# Patient Record
Sex: MALE | Race: WHITE | URBAN - METROPOLITAN AREA
[De-identification: names, ages, dates, MRNs, and addresses within clinical notes are randomized per-mention and may not be internally consistent; named-entity substitution may affect disease eponyms.]

---

## 2018-12-06 ENCOUNTER — HOSPITAL ENCOUNTER (OUTPATIENT)
Age: 38
Setting detail: SPECIMEN
Discharge: HOME | End: 2018-12-06
Payer: COMMERCIAL

## 2018-12-06 LAB
ALBUMIN SERPL-MCNC: 4.4 GM/DL (ref 3.4–5)
ALP BLD-CCNC: 55 IU/L (ref 40–129)
ALT SERPL-CCNC: 17 U/L (ref 10–40)
ANION GAP SERPL CALCULATED.3IONS-SCNC: 13 MMOL/L (ref 4–16)
AST SERPL-CCNC: 20 IU/L (ref 15–37)
BILIRUB SERPL-MCNC: 0.4 MG/DL (ref 0–1)
BUN BLDV-MCNC: 17 MG/DL (ref 6–23)
CALCIUM SERPL-MCNC: 9.6 MG/DL (ref 8.3–10.6)
CHLORIDE BLD-SCNC: 101 MMOL/L (ref 99–110)
CO2: 27 MMOL/L (ref 21–32)
CREAT SERPL-MCNC: 1.1 MG/DL (ref 0.9–1.3)
FOLATE: 16.4 NG/ML (ref 3.1–17.5)
GFR AFRICAN AMERICAN: >60 ML/MIN/1.73M2
GFR NON-AFRICAN AMERICAN: >60 ML/MIN/1.73M2
GLUCOSE BLD-MCNC: 64 MG/DL (ref 70–99)
LACTATE DEHYDROGENASE: 162 IU/L (ref 120–246)
POTASSIUM SERPL-SCNC: 4.2 MMOL/L (ref 3.5–5.1)
SODIUM BLD-SCNC: 141 MMOL/L (ref 135–145)
TOTAL PROTEIN: 6.8 GM/DL (ref 6.4–8.2)
URIC ACID: 6 MG/DL (ref 3.5–7.2)
VITAMIN B-12: 627 PG/ML (ref 211–911)

## 2018-12-06 PROCEDURE — 82746 ASSAY OF FOLIC ACID SERUM: CPT

## 2018-12-06 PROCEDURE — 86340 INTRINSIC FACTOR ANTIBODY: CPT

## 2018-12-06 PROCEDURE — 80053 COMPREHEN METABOLIC PANEL: CPT

## 2018-12-06 PROCEDURE — 82607 VITAMIN B-12: CPT

## 2018-12-06 PROCEDURE — 83615 LACTATE (LD) (LDH) ENZYME: CPT

## 2018-12-06 PROCEDURE — 84550 ASSAY OF BLOOD/URIC ACID: CPT

## 2018-12-09 LAB — INTRINSIC FACTOR AB: NEGATIVE

## 2019-03-14 ENCOUNTER — HOSPITAL ENCOUNTER (OUTPATIENT)
Age: 39
Setting detail: SPECIMEN
Discharge: HOME | End: 2019-03-14
Payer: COMMERCIAL

## 2019-03-14 LAB
ALBUMIN SERPL-MCNC: 4.6 GM/DL (ref 3.4–5)
ALP BLD-CCNC: 53 IU/L (ref 40–129)
ALT SERPL-CCNC: 15 U/L (ref 10–40)
ANION GAP SERPL CALCULATED.3IONS-SCNC: 9 MMOL/L (ref 4–16)
AST SERPL-CCNC: 19 IU/L (ref 15–37)
BILIRUB SERPL-MCNC: 0.2 MG/DL (ref 0–1)
BUN BLDV-MCNC: 17 MG/DL (ref 6–23)
CALCIUM SERPL-MCNC: 9.1 MG/DL (ref 8.3–10.6)
CHLORIDE BLD-SCNC: 103 MMOL/L (ref 99–110)
CO2: 27 MMOL/L (ref 21–32)
CREAT SERPL-MCNC: 1 MG/DL (ref 0.9–1.3)
FOLATE: >20 NG/ML (ref 3.1–17.5)
GFR AFRICAN AMERICAN: >60 ML/MIN/1.73M2
GFR NON-AFRICAN AMERICAN: >60 ML/MIN/1.73M2
GLUCOSE BLD-MCNC: 105 MG/DL (ref 70–99)
LACTATE DEHYDROGENASE: 165 IU/L (ref 120–246)
POTASSIUM SERPL-SCNC: 4.5 MMOL/L (ref 3.5–5.1)
SODIUM BLD-SCNC: 139 MMOL/L (ref 135–145)
T4 FREE: 1.26 NG/DL (ref 0.9–1.8)
TOTAL PROTEIN: 6.7 GM/DL (ref 6.4–8.2)
TSH HIGH SENSITIVITY: 1.46 UIU/ML (ref 0.27–4.2)
URIC ACID: 5.2 MG/DL (ref 3.5–7.2)
VITAMIN B-12: 587.2 PG/ML (ref 211–911)

## 2019-03-14 PROCEDURE — 83615 LACTATE (LD) (LDH) ENZYME: CPT

## 2019-03-14 PROCEDURE — 84439 ASSAY OF FREE THYROXINE: CPT

## 2019-03-14 PROCEDURE — 80053 COMPREHEN METABOLIC PANEL: CPT

## 2019-03-14 PROCEDURE — 84550 ASSAY OF BLOOD/URIC ACID: CPT

## 2019-03-14 PROCEDURE — 82607 VITAMIN B-12: CPT

## 2019-03-14 PROCEDURE — 82746 ASSAY OF FOLIC ACID SERUM: CPT

## 2019-03-14 PROCEDURE — 84443 ASSAY THYROID STIM HORMONE: CPT

## 2019-04-05 ENCOUNTER — HOSPITAL ENCOUNTER (OUTPATIENT)
Dept: CT IMAGING | Age: 39
Discharge: HOME | End: 2019-04-05
Payer: COMMERCIAL

## 2019-04-05 DIAGNOSIS — D63.0 ANEMIA ASSOC WITH CHRONIC LYMPHOCYTIC LEUKEMIA TXD WITH ERYTHROPOIETIN (HCC): ICD-10-CM

## 2019-04-05 DIAGNOSIS — C91.10 ANEMIA ASSOC WITH CHRONIC LYMPHOCYTIC LEUKEMIA TXD WITH ERYTHROPOIETIN (HCC): ICD-10-CM

## 2019-04-05 PROCEDURE — 70491 CT SOFT TISSUE NECK W/DYE: CPT

## 2019-04-05 PROCEDURE — 6360000004 HC RX CONTRAST MEDICATION: Performed by: INTERNAL MEDICINE

## 2019-04-05 RX ADMIN — IOPAMIDOL 75 ML: 755 INJECTION, SOLUTION INTRAVENOUS at 17:21

## 2019-06-27 ENCOUNTER — HOSPITAL ENCOUNTER (OUTPATIENT)
Age: 39
Setting detail: SPECIMEN
Discharge: HOME | End: 2019-06-27
Payer: COMMERCIAL

## 2019-06-27 LAB
ALBUMIN SERPL-MCNC: 4.5 GM/DL (ref 3.4–5)
ALP BLD-CCNC: 47 IU/L (ref 40–128)
ALT SERPL-CCNC: 13 U/L (ref 10–40)
ANION GAP SERPL CALCULATED.3IONS-SCNC: 10 MMOL/L (ref 4–16)
AST SERPL-CCNC: 16 IU/L (ref 15–37)
BILIRUB SERPL-MCNC: 0.2 MG/DL (ref 0–1)
BUN BLDV-MCNC: 23 MG/DL (ref 6–23)
CALCIUM SERPL-MCNC: 9.5 MG/DL (ref 8.3–10.6)
CHLORIDE BLD-SCNC: 107 MMOL/L (ref 99–110)
CO2: 26 MMOL/L (ref 21–32)
CREAT SERPL-MCNC: 1.1 MG/DL (ref 0.9–1.3)
GFR AFRICAN AMERICAN: >60 ML/MIN/1.73M2
GFR NON-AFRICAN AMERICAN: >60 ML/MIN/1.73M2
GLUCOSE BLD-MCNC: 102 MG/DL (ref 70–99)
LACTATE DEHYDROGENASE: 158 IU/L (ref 120–246)
POTASSIUM SERPL-SCNC: 4.9 MMOL/L (ref 3.5–5.1)
SODIUM BLD-SCNC: 143 MMOL/L (ref 135–145)
TOTAL PROTEIN: 6.7 GM/DL (ref 6.4–8.2)
URIC ACID: 4.7 MG/DL (ref 3.5–7.2)

## 2019-06-27 PROCEDURE — 84550 ASSAY OF BLOOD/URIC ACID: CPT

## 2019-06-27 PROCEDURE — 80053 COMPREHEN METABOLIC PANEL: CPT

## 2019-06-27 PROCEDURE — 83615 LACTATE (LD) (LDH) ENZYME: CPT

## 2019-09-18 ENCOUNTER — HOSPITAL ENCOUNTER (EMERGENCY)
Age: 39
Discharge: HOME | End: 2019-09-18
Attending: EMERGENCY MEDICINE
Payer: COMMERCIAL

## 2019-09-18 VITALS
HEIGHT: 74 IN | SYSTOLIC BLOOD PRESSURE: 132 MMHG | BODY MASS INDEX: 40.43 KG/M2 | RESPIRATION RATE: 16 BRPM | OXYGEN SATURATION: 100 % | DIASTOLIC BLOOD PRESSURE: 97 MMHG | WEIGHT: 315 LBS | TEMPERATURE: 98.1 F | HEART RATE: 82 BPM

## 2019-09-18 DIAGNOSIS — K60.2 ANAL FISSURE: Primary | ICD-10-CM

## 2019-09-18 LAB
ALBUMIN SERPL-MCNC: 4.4 GM/DL (ref 3.4–5)
ALP BLD-CCNC: 53 IU/L (ref 40–128)
ALT SERPL-CCNC: 20 U/L (ref 10–40)
ANION GAP SERPL CALCULATED.3IONS-SCNC: 11 MMOL/L (ref 4–16)
AST SERPL-CCNC: 20 IU/L (ref 15–37)
BASOPHILS ABSOLUTE: 0.1 K/CU MM
BASOPHILS RELATIVE PERCENT: 0.7 % (ref 0–1)
BILIRUB SERPL-MCNC: 0.2 MG/DL (ref 0–1)
BUN BLDV-MCNC: 26 MG/DL (ref 6–23)
CALCIUM SERPL-MCNC: 9 MG/DL (ref 8.3–10.6)
CHLORIDE BLD-SCNC: 103 MMOL/L (ref 99–110)
CO2: 25 MMOL/L (ref 21–32)
CREAT SERPL-MCNC: 0.9 MG/DL (ref 0.9–1.3)
DIFFERENTIAL TYPE: ABNORMAL
EOSINOPHILS ABSOLUTE: 0.3 K/CU MM
EOSINOPHILS RELATIVE PERCENT: 2.4 % (ref 0–3)
GFR AFRICAN AMERICAN: >60 ML/MIN/1.73M2
GFR NON-AFRICAN AMERICAN: >60 ML/MIN/1.73M2
GLUCOSE BLD-MCNC: 92 MG/DL (ref 70–99)
HCT VFR BLD CALC: 44.9 % (ref 42–52)
HEMOGLOBIN: 15 GM/DL (ref 13.5–18)
IMMATURE NEUTROPHIL %: 0.3 % (ref 0–0.43)
LYMPHOCYTES ABSOLUTE: 5.9 K/CU MM
LYMPHOCYTES RELATIVE PERCENT: 50.2 % (ref 24–44)
MCH RBC QN AUTO: 29.9 PG (ref 27–31)
MCHC RBC AUTO-ENTMCNC: 33.4 % (ref 32–36)
MCV RBC AUTO: 89.4 FL (ref 78–100)
MONOCYTES ABSOLUTE: 1.3 K/CU MM
MONOCYTES RELATIVE PERCENT: 11.2 % (ref 0–4)
NUCLEATED RBC %: 0 %
PDW BLD-RTO: 13.3 % (ref 11.7–14.9)
PLATELET # BLD: 152 K/CU MM (ref 140–440)
PMV BLD AUTO: 11 FL (ref 7.5–11.1)
POTASSIUM SERPL-SCNC: 4.2 MMOL/L (ref 3.5–5.1)
RBC # BLD: 5.02 M/CU MM (ref 4.6–6.2)
SEGMENTED NEUTROPHILS ABSOLUTE COUNT: 4.2 K/CU MM
SEGMENTED NEUTROPHILS RELATIVE PERCENT: 35.2 % (ref 36–66)
SODIUM BLD-SCNC: 139 MMOL/L (ref 135–145)
TOTAL IMMATURE NEUTOROPHIL: 0.03 K/CU MM
TOTAL NUCLEATED RBC: 0 K/CU MM
TOTAL PROTEIN: 6.9 GM/DL (ref 6.4–8.2)
WBC # BLD: 11.8 K/CU MM (ref 4–10.5)

## 2019-09-18 PROCEDURE — 85025 COMPLETE CBC W/AUTO DIFF WBC: CPT

## 2019-09-18 PROCEDURE — 99283 EMERGENCY DEPT VISIT LOW MDM: CPT

## 2019-09-18 PROCEDURE — 80053 COMPREHEN METABOLIC PANEL: CPT

## 2019-09-18 SDOH — HEALTH STABILITY: MENTAL HEALTH: HOW OFTEN DO YOU HAVE A DRINK CONTAINING ALCOHOL?: NEVER

## 2019-09-18 ASSESSMENT — PAIN DESCRIPTION - LOCATION: LOCATION: ABDOMEN

## 2019-09-18 ASSESSMENT — PAIN DESCRIPTION - DESCRIPTORS: DESCRIPTORS: CRAMPING

## 2019-09-18 ASSESSMENT — PAIN DESCRIPTION - PAIN TYPE: TYPE: ACUTE PAIN

## 2019-09-18 ASSESSMENT — PAIN DESCRIPTION - ORIENTATION: ORIENTATION: LOWER

## 2019-09-18 ASSESSMENT — PAIN SCALES - GENERAL: PAINLEVEL_OUTOF10: 2

## 2019-10-31 ENCOUNTER — HOSPITAL ENCOUNTER (OUTPATIENT)
Age: 39
Setting detail: SPECIMEN
Discharge: HOME | End: 2019-10-31
Payer: COMMERCIAL

## 2019-10-31 LAB
ALBUMIN SERPL-MCNC: 4.5 GM/DL (ref 3.4–5)
ALP BLD-CCNC: 58 IU/L (ref 40–129)
ALT SERPL-CCNC: 19 U/L (ref 10–40)
ANION GAP SERPL CALCULATED.3IONS-SCNC: 11 MMOL/L (ref 4–16)
AST SERPL-CCNC: 20 IU/L (ref 15–37)
BILIRUB SERPL-MCNC: 0.2 MG/DL (ref 0–1)
BUN BLDV-MCNC: 17 MG/DL (ref 6–23)
CALCIUM SERPL-MCNC: 9.8 MG/DL (ref 8.3–10.6)
CHLORIDE BLD-SCNC: 103 MMOL/L (ref 99–110)
CO2: 27 MMOL/L (ref 21–32)
CREAT SERPL-MCNC: 1 MG/DL (ref 0.9–1.3)
GFR AFRICAN AMERICAN: >60 ML/MIN/1.73M2
GFR NON-AFRICAN AMERICAN: >60 ML/MIN/1.73M2
GLUCOSE BLD-MCNC: 97 MG/DL (ref 70–99)
LACTATE DEHYDROGENASE: 170 IU/L (ref 120–246)
POTASSIUM SERPL-SCNC: 4.4 MMOL/L (ref 3.5–5.1)
SODIUM BLD-SCNC: 141 MMOL/L (ref 135–145)
TOTAL PROTEIN: 6.9 GM/DL (ref 6.4–8.2)
URIC ACID: 5.1 MG/DL (ref 3.5–7.2)

## 2019-10-31 PROCEDURE — 80053 COMPREHEN METABOLIC PANEL: CPT

## 2019-10-31 PROCEDURE — 84550 ASSAY OF BLOOD/URIC ACID: CPT

## 2019-10-31 PROCEDURE — 83615 LACTATE (LD) (LDH) ENZYME: CPT

## 2020-06-11 ENCOUNTER — HOSPITAL ENCOUNTER (OUTPATIENT)
Dept: INFUSION THERAPY | Age: 40
Discharge: HOME | End: 2020-06-11
Payer: COMMERCIAL

## 2020-06-11 LAB
ALBUMIN SERPL-MCNC: 4.9 GM/DL (ref 3.4–5)
ALP BLD-CCNC: 60 IU/L (ref 40–129)
ALT SERPL-CCNC: 21 U/L (ref 10–40)
ANION GAP SERPL CALCULATED.3IONS-SCNC: 12 MMOL/L (ref 4–16)
AST SERPL-CCNC: 28 IU/L (ref 15–37)
BASOPHILS ABSOLUTE: 0.1 K/CU MM
BASOPHILS RELATIVE PERCENT: 0.3 % (ref 0–1)
BILIRUB SERPL-MCNC: 0.4 MG/DL (ref 0–1)
BUN BLDV-MCNC: 24 MG/DL (ref 6–23)
CALCIUM SERPL-MCNC: 9.7 MG/DL (ref 8.3–10.6)
CHLORIDE BLD-SCNC: 101 MMOL/L (ref 99–110)
CO2: 23 MMOL/L (ref 21–32)
CREAT SERPL-MCNC: 1.1 MG/DL (ref 0.9–1.3)
DIFFERENTIAL TYPE: ABNORMAL
EOSINOPHILS ABSOLUTE: 0.3 K/CU MM
EOSINOPHILS RELATIVE PERCENT: 2 % (ref 0–3)
GFR AFRICAN AMERICAN: >60 ML/MIN/1.73M2
GFR NON-AFRICAN AMERICAN: >60 ML/MIN/1.73M2
GLUCOSE BLD-MCNC: 89 MG/DL (ref 70–99)
HCT VFR BLD CALC: 44.2 % (ref 42–52)
HEMOGLOBIN: 15.7 GM/DL (ref 13.5–18)
LACTATE DEHYDROGENASE: 190 IU/L (ref 120–246)
LYMPHOCYTES ABSOLUTE: 7 K/CU MM
LYMPHOCYTES RELATIVE PERCENT: 44.7 % (ref 24–44)
MCH RBC QN AUTO: 30.1 PG (ref 27–31)
MCHC RBC AUTO-ENTMCNC: 35.5 % (ref 32–36)
MCV RBC AUTO: 84.8 FL (ref 78–100)
MONOCYTES ABSOLUTE: 1.4 K/CU MM
MONOCYTES RELATIVE PERCENT: 9 % (ref 0–4)
PDW BLD-RTO: 13.4 % (ref 11.7–14.9)
PLATELET # BLD: 174 K/CU MM (ref 140–440)
PMV BLD AUTO: 10.7 FL (ref 7.5–11.1)
POTASSIUM SERPL-SCNC: 4.1 MMOL/L (ref 3.5–5.1)
RBC # BLD: 5.21 M/CU MM (ref 4.6–6.2)
SEGMENTED NEUTROPHILS ABSOLUTE COUNT: 6.9 K/CU MM
SEGMENTED NEUTROPHILS RELATIVE PERCENT: 44 % (ref 36–66)
SODIUM BLD-SCNC: 136 MMOL/L (ref 135–145)
TOTAL PROTEIN: 7.3 GM/DL (ref 6.4–8.2)
URIC ACID: 5.1 MG/DL (ref 3.5–7.2)
WBC # BLD: 15.7 K/CU MM (ref 4–10.5)

## 2020-06-11 PROCEDURE — 84550 ASSAY OF BLOOD/URIC ACID: CPT

## 2020-06-11 PROCEDURE — 83615 LACTATE (LD) (LDH) ENZYME: CPT

## 2020-06-11 PROCEDURE — 85025 COMPLETE CBC W/AUTO DIFF WBC: CPT

## 2020-06-11 PROCEDURE — 99211 OFF/OP EST MAY X REQ PHY/QHP: CPT

## 2020-06-11 PROCEDURE — 36415 COLL VENOUS BLD VENIPUNCTURE: CPT

## 2020-06-11 PROCEDURE — 80053 COMPREHEN METABOLIC PANEL: CPT

## 2020-06-22 LAB
Lab: NORMAL
TEST NAME: NORMAL

## 2020-10-28 PROBLEM — D69.6 THROMBOCYTOPENIA, UNSPECIFIED (HCC): Status: ACTIVE | Noted: 2020-10-28

## 2020-10-28 PROBLEM — F17.200 NICOTINE DEPENDENCE, UNSPECIFIED, UNCOMPLICATED: Status: ACTIVE | Noted: 2020-10-28

## 2020-10-28 PROBLEM — C91.10 CHRONIC LYMPHOCYTIC LEUKEMIA OF B-CELL TYPE NOT HAVING ACHIEVED REMISSION (HCC): Status: ACTIVE | Noted: 2020-10-28

## 2020-10-28 PROBLEM — F34.1 DYSTHYMIC DISORDER: Status: ACTIVE | Noted: 2020-10-28

## 2020-10-28 PROBLEM — D72.829 ELEVATED WHITE BLOOD CELL COUNT, UNSPECIFIED: Status: ACTIVE | Noted: 2020-10-28

## 2020-10-28 PROBLEM — R53.82 CHRONIC FATIGUE, UNSPECIFIED: Status: ACTIVE | Noted: 2020-10-28

## 2020-12-18 ENCOUNTER — HOSPITAL ENCOUNTER (OUTPATIENT)
Dept: INFUSION THERAPY | Age: 40
Discharge: HOME | End: 2020-12-18
Payer: COMMERCIAL

## 2020-12-18 ENCOUNTER — OFFICE VISIT (OUTPATIENT)
Dept: ONCOLOGY | Age: 40
End: 2020-12-18
Payer: COMMERCIAL

## 2020-12-18 VITALS
BODY MASS INDEX: 34.39 KG/M2 | TEMPERATURE: 97.4 F | DIASTOLIC BLOOD PRESSURE: 74 MMHG | SYSTOLIC BLOOD PRESSURE: 135 MMHG | HEART RATE: 89 BPM | OXYGEN SATURATION: 98 % | HEIGHT: 74 IN | WEIGHT: 268 LBS

## 2020-12-18 DIAGNOSIS — C91.10 CHRONIC LYMPHOCYTIC LEUKEMIA OF B-CELL TYPE NOT HAVING ACHIEVED REMISSION (HCC): ICD-10-CM

## 2020-12-18 LAB
ALBUMIN SERPL-MCNC: 4.6 GM/DL (ref 3.4–5)
ALP BLD-CCNC: 51 IU/L (ref 40–128)
ALT SERPL-CCNC: 22 U/L (ref 10–40)
ANION GAP SERPL CALCULATED.3IONS-SCNC: 9 MMOL/L (ref 4–16)
AST SERPL-CCNC: 25 IU/L (ref 15–37)
BASOPHILS ABSOLUTE: 0.1 K/CU MM
BASOPHILS RELATIVE PERCENT: 0.4 % (ref 0–1)
BILIRUB SERPL-MCNC: 0.3 MG/DL (ref 0–1)
BUN BLDV-MCNC: 19 MG/DL (ref 6–23)
CALCIUM SERPL-MCNC: 9.3 MG/DL (ref 8.3–10.6)
CHLORIDE BLD-SCNC: 98 MMOL/L (ref 99–110)
CO2: 25 MMOL/L (ref 21–32)
CREAT SERPL-MCNC: 1 MG/DL (ref 0.9–1.3)
DIFFERENTIAL TYPE: ABNORMAL
EOSINOPHILS ABSOLUTE: 0.3 K/CU MM
EOSINOPHILS RELATIVE PERCENT: 2.2 % (ref 0–3)
GFR AFRICAN AMERICAN: >60 ML/MIN/1.73M2
GFR NON-AFRICAN AMERICAN: >60 ML/MIN/1.73M2
GLUCOSE BLD-MCNC: 96 MG/DL (ref 70–99)
HCT VFR BLD CALC: 44.7 % (ref 42–52)
HEMOGLOBIN: 15.9 GM/DL (ref 13.5–18)
LACTATE DEHYDROGENASE: 203 IU/L (ref 120–246)
LYMPHOCYTES ABSOLUTE: 6.9 K/CU MM
LYMPHOCYTES RELATIVE PERCENT: 50.8 % (ref 24–44)
MCH RBC QN AUTO: 30.6 PG (ref 27–31)
MCHC RBC AUTO-ENTMCNC: 35.6 % (ref 32–36)
MCV RBC AUTO: 86 FL (ref 78–100)
MONOCYTES ABSOLUTE: 1.3 K/CU MM
MONOCYTES RELATIVE PERCENT: 9.3 % (ref 0–4)
PDW BLD-RTO: 13.7 % (ref 11.7–14.9)
PLATELET # BLD: 158 K/CU MM (ref 140–440)
PMV BLD AUTO: 10.8 FL (ref 7.5–11.1)
POTASSIUM SERPL-SCNC: 4.3 MMOL/L (ref 3.5–5.1)
RBC # BLD: 5.2 M/CU MM (ref 4.6–6.2)
SEGMENTED NEUTROPHILS ABSOLUTE COUNT: 5.1 K/CU MM
SEGMENTED NEUTROPHILS RELATIVE PERCENT: 37.3 % (ref 36–66)
SODIUM BLD-SCNC: 132 MMOL/L (ref 135–145)
TOTAL PROTEIN: 7 GM/DL (ref 6.4–8.2)
WBC # BLD: 13.6 K/CU MM (ref 4–10.5)

## 2020-12-18 PROCEDURE — G8417 CALC BMI ABV UP PARAM F/U: HCPCS | Performed by: INTERNAL MEDICINE

## 2020-12-18 PROCEDURE — G8427 DOCREV CUR MEDS BY ELIG CLIN: HCPCS | Performed by: INTERNAL MEDICINE

## 2020-12-18 PROCEDURE — 99211 OFF/OP EST MAY X REQ PHY/QHP: CPT

## 2020-12-18 PROCEDURE — G8484 FLU IMMUNIZE NO ADMIN: HCPCS | Performed by: INTERNAL MEDICINE

## 2020-12-18 PROCEDURE — 99214 OFFICE O/P EST MOD 30 MIN: CPT | Performed by: INTERNAL MEDICINE

## 2020-12-18 PROCEDURE — 80053 COMPREHEN METABOLIC PANEL: CPT

## 2020-12-18 PROCEDURE — 36415 COLL VENOUS BLD VENIPUNCTURE: CPT

## 2020-12-18 PROCEDURE — 4004F PT TOBACCO SCREEN RCVD TLK: CPT | Performed by: INTERNAL MEDICINE

## 2020-12-18 PROCEDURE — 85025 COMPLETE CBC W/AUTO DIFF WBC: CPT

## 2020-12-18 PROCEDURE — 83615 LACTATE (LD) (LDH) ENZYME: CPT

## 2020-12-18 RX ORDER — M-VIT,TX,IRON,MINS/CALC/FOLIC 27MG-0.4MG
1 TABLET ORAL DAILY
COMMUNITY

## 2020-12-18 RX ORDER — NAPROXEN 250 MG/1
250 TABLET ORAL 2 TIMES DAILY WITH MEALS
COMMUNITY

## 2020-12-18 RX ORDER — ASPIRIN 81 MG/1
81 TABLET, CHEWABLE ORAL DAILY
COMMUNITY

## 2020-12-18 RX ORDER — BUPROPION HYDROCHLORIDE 150 MG/1
TABLET ORAL
COMMUNITY
Start: 2020-10-22

## 2020-12-18 ASSESSMENT — PATIENT HEALTH QUESTIONNAIRE - PHQ9
SUM OF ALL RESPONSES TO PHQ9 QUESTIONS 1 & 2: 0
2. FEELING DOWN, DEPRESSED OR HOPELESS: 0
1. LITTLE INTEREST OR PLEASURE IN DOING THINGS: 0
SUM OF ALL RESPONSES TO PHQ QUESTIONS 1-9: 0

## 2020-12-18 NOTE — PROGRESS NOTES
Patient Name: Mckenzie High  Patient : 1980  Patient MRN: [id number]     Primary Oncologist: Anne Sheth MD  Referring Physician: Luiza Pena MD       Date of Service: 2020      Chief Complaint:   Chief Complaint   Patient presents with    Follow-up        Active Problem list  1. Chronic lymphocytic leukemia of B-cell type not having achieved remission [facility])           HPI:        33 yo w.m. with mild CLL    2018 flow cytometry with monoclonal B-cell population with chronic lymphocytic leukemia/small lymphocytic lymphoma-like immunophenotype. Negative BCR able. FISH with trisomy 12    2019 CT scan of the soft tissue neck with no acute abnormality. 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 Naproxen Oral 500 mg tablet   Aspirin Oral 81 mg tablet,delayed release (DR/EC)      Social History  Lives alone  Two daughter   Currently employed in construction  Smokes tob 1 ppd for on and off for the past 25 years, quit for almost 5 years [id number]  No excess etoh or any other illicit drug abuse      Family History  Maternal Grand mother lung cancer  Maternal uncle with colon cancer   No h/o leukemia. , lymphoma or any blood dyscrasias. Interval History  2020: Arrived alone to the clinic today. Denied any fever, night sweats. Reported weight gain. Reported fatigue but works long hours. Denied chest pain, increase shortness breath, palpitations or dizziness. Reported blood streaked stools for the past 1 month which has been intermittent. Reported that he had colonoscopy 2 years ago as family history of colon cancer. Was told that he had anal fissure. Reported constipation. Metamucil helps sometimes.   Denied any abdominal pain. Denied any nausea vomiting. Denied any  symptoms. Denied any other overt bleeding.     Review of Systems   Per interval history; otherwise 10 point ROS is negative              Vital Signs:  /74 (Site: Right Upper Arm, Position: Sitting, Cuff Size: Large Adult)   Pulse 89   Temp 97.4 °F (36.3 °C) (Infrared)   Ht 6' 2\" (1.88 m)   Wt 268 lb (121.6 kg)   SpO2 98%   BMI 34.41 kg/m²     Physical Exam:  CONSTITUTIONAL: awake, alert, cooperative, no apparent distress   EYES: JUNIOR, No pallor or any icterus   ENT: ATNC   NECK: No JVD   HEMATOLOGIC/LYMPHATIC: no cervical, supraclavicular or axillary lymphadenopathy   LUNGS:CTAB   CARDIOVASCULAR: s1s2 rrr   ABDOMEN:soft ntnd bs pos no hsm  MUSCULOSKELETAL: full range of motion noted, tone is normal   NEUROLOGIC: GI   SKIN: No rash   EXTREMITIES: no LE edema bilaterally        Labs:    Hematology:  Lab Results   Component Value Date    WBC 13.6 (H) 12/18/2020    RBC 5.20 12/18/2020    HGB 15.9 12/18/2020    HCT 44.7 12/18/2020    MCV 86.0 12/18/2020    MCH 30.6 12/18/2020    MCHC 35.6 12/18/2020    RDW 13.7 12/18/2020     12/18/2020    MPV 10.8 12/18/2020    SEGSPCT 37.3 12/18/2020    EOSRELPCT 2.2 12/18/2020    BASOPCT 0.4 12/18/2020    LYMPHOPCT 50.8 (H) 12/18/2020    MONOPCT 9.3 (H) 12/18/2020    SEGSABS 5.1 12/18/2020    EOSABS 0.3 12/18/2020    BASOSABS 0.1 12/18/2020    LYMPHSABS 6.9 12/18/2020    MONOSABS 1.3 12/18/2020    DIFFTYPE AUTOMATED DIFFERENTIAL 12/18/2020     No results found for: ESR    Chemistry:  Lab Results   Component Value Date     (L) 12/18/2020    K 4.3 12/18/2020    CL 98 (L) 12/18/2020    CO2 25 12/18/2020    BUN 19 12/18/2020    CREATININE 1.0 12/18/2020    GLUCOSE 96 12/18/2020    CALCIUM 9.3 12/18/2020    PROT 7.0 12/18/2020    LABALBU 4.6 12/18/2020    BILITOT 0.3 12/18/2020    ALKPHOS 51 12/18/2020    AST 25 12/18/2020    ALT 22 12/18/2020    LABGLOM >60 12/18/2020    GFRAA >60 12/18/2020     Lab

## 2020-12-18 NOTE — PROGRESS NOTES
MA Rooming Questions  Patient: Candy Seals  MRN: [id number]    Date: 12/18/2020        1. Do you have any new issues?   no         2. Do you need any refills on medications?    no    3. Have you had any imaging done since your last visit?   no    4. Have you been hospitalized or seen in the emergency room since your last visit here?   no    5. Did the patient have a depression screening completed today?  Yes    PHQ-9 Total Score: 0 (12/18/2020 11:28 AM)       PHQ-9 Given to (if applicable):               PHQ-9 Score (if applicable):                     [] Positive     []  Negative              Does question #9 need addressed (if applicable)                     [] Yes    []  No               Aden aYng MA

## 2021-05-03 ENCOUNTER — TELEPHONE (OUTPATIENT)
Dept: ONCOLOGY | Age: 41
End: 2021-05-03

## 2021-05-04 NOTE — TELEPHONE ENCOUNTER
Dr Nic Vick stated that patient can receive the COVID vaccine. Informed patient and he verbalized understanding.

## 2021-06-14 ENCOUNTER — HOSPITAL ENCOUNTER (OUTPATIENT)
Dept: INFUSION THERAPY | Age: 41
Discharge: HOME | End: 2021-06-14
Payer: COMMERCIAL

## 2021-06-14 DIAGNOSIS — C91.10 CHRONIC LYMPHOCYTIC LEUKEMIA OF B-CELL TYPE NOT HAVING ACHIEVED REMISSION (HCC): ICD-10-CM

## 2021-06-14 LAB
ALBUMIN SERPL-MCNC: 4.5 GM/DL (ref 3.4–5)
ALP BLD-CCNC: 50 IU/L (ref 40–128)
ALT SERPL-CCNC: 18 U/L (ref 10–40)
ANION GAP SERPL CALCULATED.3IONS-SCNC: 9 MMOL/L (ref 4–16)
AST SERPL-CCNC: 21 IU/L (ref 15–37)
BASOPHILS ABSOLUTE: 0.1 K/CU MM
BASOPHILS RELATIVE PERCENT: 0.4 % (ref 0–1)
BILIRUB SERPL-MCNC: 0.3 MG/DL (ref 0–1)
BUN BLDV-MCNC: 19 MG/DL (ref 6–23)
CALCIUM SERPL-MCNC: 9.8 MG/DL (ref 8.3–10.6)
CHLORIDE BLD-SCNC: 104 MMOL/L (ref 99–110)
CO2: 27 MMOL/L (ref 21–32)
CREAT SERPL-MCNC: 1 MG/DL (ref 0.9–1.3)
DIFFERENTIAL TYPE: ABNORMAL
EOSINOPHILS ABSOLUTE: 0.3 K/CU MM
EOSINOPHILS RELATIVE PERCENT: 2.1 % (ref 0–3)
GFR AFRICAN AMERICAN: >60 ML/MIN/1.73M2
GFR NON-AFRICAN AMERICAN: >60 ML/MIN/1.73M2
GLUCOSE BLD-MCNC: 104 MG/DL (ref 70–99)
HCT VFR BLD CALC: 43.1 % (ref 42–52)
HEMOGLOBIN: 15.4 GM/DL (ref 13.5–18)
LACTATE DEHYDROGENASE: 183 IU/L (ref 120–246)
LYMPHOCYTES ABSOLUTE: 6.1 K/CU MM
LYMPHOCYTES RELATIVE PERCENT: 48.3 % (ref 24–44)
MCH RBC QN AUTO: 30.3 PG (ref 27–31)
MCHC RBC AUTO-ENTMCNC: 35.7 % (ref 32–36)
MCV RBC AUTO: 84.8 FL (ref 78–100)
MONOCYTES ABSOLUTE: 1 K/CU MM
MONOCYTES RELATIVE PERCENT: 8.1 % (ref 0–4)
PDW BLD-RTO: 13.4 % (ref 11.7–14.9)
PLATELET # BLD: 155 K/CU MM (ref 140–440)
PMV BLD AUTO: 10.7 FL (ref 7.5–11.1)
POTASSIUM SERPL-SCNC: 3.9 MMOL/L (ref 3.5–5.1)
RBC # BLD: 5.08 M/CU MM (ref 4.6–6.2)
SEGMENTED NEUTROPHILS ABSOLUTE COUNT: 5.2 K/CU MM
SEGMENTED NEUTROPHILS RELATIVE PERCENT: 41.1 % (ref 36–66)
SODIUM BLD-SCNC: 140 MMOL/L (ref 135–145)
TOTAL PROTEIN: 6.7 GM/DL (ref 6.4–8.2)
WBC # BLD: 12.5 K/CU MM (ref 4–10.5)

## 2021-06-14 PROCEDURE — 83615 LACTATE (LD) (LDH) ENZYME: CPT

## 2021-06-14 PROCEDURE — 36415 COLL VENOUS BLD VENIPUNCTURE: CPT

## 2021-06-14 PROCEDURE — 85025 COMPLETE CBC W/AUTO DIFF WBC: CPT

## 2021-06-14 PROCEDURE — 80053 COMPREHEN METABOLIC PANEL: CPT

## 2021-06-21 ENCOUNTER — OFFICE VISIT (OUTPATIENT)
Dept: ONCOLOGY | Age: 41
End: 2021-06-21
Payer: COMMERCIAL

## 2021-06-21 ENCOUNTER — HOSPITAL ENCOUNTER (OUTPATIENT)
Dept: INFUSION THERAPY | Age: 41
Discharge: HOME | End: 2021-06-21
Payer: COMMERCIAL

## 2021-06-21 VITALS
RESPIRATION RATE: 16 BRPM | HEART RATE: 108 BPM | TEMPERATURE: 98.7 F | OXYGEN SATURATION: 98 % | WEIGHT: 259.8 LBS | HEIGHT: 73 IN | DIASTOLIC BLOOD PRESSURE: 81 MMHG | BODY MASS INDEX: 34.43 KG/M2 | SYSTOLIC BLOOD PRESSURE: 122 MMHG

## 2021-06-21 DIAGNOSIS — C91.10 CHRONIC LYMPHOCYTIC LEUKEMIA OF B-CELL TYPE NOT HAVING ACHIEVED REMISSION (HCC): Primary | ICD-10-CM

## 2021-06-21 PROCEDURE — 99213 OFFICE O/P EST LOW 20 MIN: CPT | Performed by: INTERNAL MEDICINE

## 2021-06-21 PROCEDURE — G8427 DOCREV CUR MEDS BY ELIG CLIN: HCPCS | Performed by: INTERNAL MEDICINE

## 2021-06-21 PROCEDURE — G8417 CALC BMI ABV UP PARAM F/U: HCPCS | Performed by: INTERNAL MEDICINE

## 2021-06-21 PROCEDURE — 99211 OFF/OP EST MAY X REQ PHY/QHP: CPT

## 2021-06-21 PROCEDURE — 4004F PT TOBACCO SCREEN RCVD TLK: CPT | Performed by: INTERNAL MEDICINE

## 2021-06-21 ASSESSMENT — PATIENT HEALTH QUESTIONNAIRE - PHQ9
SUM OF ALL RESPONSES TO PHQ9 QUESTIONS 1 & 2: 0
1. LITTLE INTEREST OR PLEASURE IN DOING THINGS: 0
SUM OF ALL RESPONSES TO PHQ QUESTIONS 1-9: 0
2. FEELING DOWN, DEPRESSED OR HOPELESS: 0
SUM OF ALL RESPONSES TO PHQ QUESTIONS 1-9: 0
SUM OF ALL RESPONSES TO PHQ QUESTIONS 1-9: 0

## 2021-06-21 NOTE — PROGRESS NOTES
MA Rooming Questions  Patient: Isabel Luke  MRN: [id number]    Date: 6/21/2021        1. Do you have any new issues?   no         2. Do you need any refills on medications?    no    3. Have you had any imaging done since your last visit?   no    4. Have you been hospitalized or seen in the emergency room since your last visit here?   no    5. Did the patient have a depression screening completed today?  Yes    PHQ-9 Total Score: 0 (6/21/2021  3:47 PM)       PHQ-9 Given to (if applicable):               PHQ-9 Score (if applicable):                     [] Positive     []  Negative              Does question #9 need addressed (if applicable)                     [] Yes    []  No               Maya Sanchez CMA

## 2021-06-21 NOTE — PROGRESS NOTES
Patient Name: Deborah Castro  Patient : 1980  Patient MRN: [id number]     Primary Oncologist: Patience Nguyen MD  Referring Physician: Diego Hahn MD       Date of Service: 2021      Chief Complaint:   Chief Complaint   Patient presents with    Discuss Labs        Active Problem list  1. Chronic lymphocytic leukemia of B-cell type not having achieved remission [facility])           HPI:        35 yo w.m. with mild CLL    2018 flow cytometry with monoclonal B-cell population with chronic lymphocytic leukemia/small lymphocytic lymphoma-like immunophenotype. Negative BCR able. FISH with trisomy 12    2019 CT scan of the soft tissue neck with no acute abnormality. 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 Naproxen Oral 500 mg tablet   Aspirin Oral 81 mg tablet,delayed release (DR/EC)      Social History  Lives alone  Two daughter   Currently employed in construction  Smokes tob 1 ppd for on and off for the past 25 years, quit for almost 5 years [id number]  No excess etoh or any other illicit drug abuse      Family History  Maternal Grand mother lung cancer  Maternal uncle with colon cancer   No h/o leukemia. , lymphoma or any blood dyscrasias. Interval History  2021: Arrived alone to the clinic today. Reported sore throat. Denied any cough. Denied any fever, night sweats, lymphadenopathy. Denied any chest pain, increase shortness of breath, palpitations or dizziness. Reported fatigue as he works long hours. Denied any abdominal pain, nausea, vomiting, diarrhea, constipation. Denied any dysphagia or odynophagia. Denied lower extremity edema. Denied any vision changes or any focal weakness. Denied any overt bleeding. 06/14/2021    GFRAA >60 06/14/2021     Lab Results   Component Value Date     06/14/2021     No components found for: LD  Lab Results   Component Value Date    TSHHS 1.460 03/14/2019    T4FREE 1.26 03/14/2019       Immunology:  Lab Results   Component Value Date    PROT 6.7 06/14/2021     No results found for: Alveta Barge, KLFLCR  No results found for: B2M    Coagulation Panel:  No results found for: PROTIME, INR, APTT, DDIMER    Anemia Panel:  Lab Results   Component Value Date    TTKWRGJG18 587.2 03/14/2019    FOLATE >20.0 (H) 03/14/2019       Tumor Markers:  No results found for: , CEA, , LABCA2, PSA      Imaging: Reviewed     Pathology:Reviewed     Observations:  Performance Status: ECOG 0  Depression Status: PHQ-9 Total Score: 0 (6/21/2021  3:47 PM)          Assessment & Plan:  CLL: Trisomy 12. CD38 negative. IGVH unmutated, Unfavourable. Looks like absolute lymphocytosis, stage 0. No indication for treatment As no B symptoms, no cytopenia, rapid doubling. .. other than chronic non drenching night sweats. Will monitor per NCCN guidelines. IVIG as needed. GIB: Resolved    Continue other medical care. Discussed above findings and plan with him and he verbalized understanding. Discussed healthy lifestyle including smoking cessation, healthy diet, regular exercise as tolerated and weight loss. Also discussed importance of being up-to-date with age-appropriate screening tools. Recommend follow-up with primary care physician and other specialist.    Please do not hesitate to contact us if you need further information. Return to clinic Dec 2021 or earlier if new symptoms. I have recommended that the patient follow CDC guidelines for prevention of COVID-19 infection.     [address]           Electronically signed by Sophia Atkinson MD on 6/22/2021 at 6:00 PM

## 2021-12-08 ENCOUNTER — HOSPITAL ENCOUNTER (OUTPATIENT)
Dept: INFUSION THERAPY | Age: 41
Discharge: HOME | End: 2021-12-08
Payer: COMMERCIAL

## 2021-12-08 DIAGNOSIS — C91.10 CHRONIC LYMPHOCYTIC LEUKEMIA OF B-CELL TYPE NOT HAVING ACHIEVED REMISSION (HCC): ICD-10-CM

## 2021-12-08 LAB
ALBUMIN SERPL-MCNC: 4.8 GM/DL (ref 3.4–5)
ALP BLD-CCNC: 50 IU/L (ref 40–129)
ALT SERPL-CCNC: 18 U/L (ref 10–40)
ANION GAP SERPL CALCULATED.3IONS-SCNC: 9 MMOL/L (ref 4–16)
AST SERPL-CCNC: 22 IU/L (ref 15–37)
BILIRUB SERPL-MCNC: 0.4 MG/DL (ref 0–1)
BUN BLDV-MCNC: 25 MG/DL (ref 6–23)
CALCIUM SERPL-MCNC: 9.7 MG/DL (ref 8.3–10.6)
CHLORIDE BLD-SCNC: 101 MMOL/L (ref 99–110)
CO2: 28 MMOL/L (ref 21–32)
CREAT SERPL-MCNC: 1 MG/DL (ref 0.9–1.3)
GFR AFRICAN AMERICAN: >60 ML/MIN/1.73M2
GFR NON-AFRICAN AMERICAN: >60 ML/MIN/1.73M2
GLUCOSE BLD-MCNC: 105 MG/DL (ref 70–99)
IGG,SERUM: 859 MG/DL (ref 723–1685)
LACTATE DEHYDROGENASE: 181 IU/L (ref 120–246)
POTASSIUM SERPL-SCNC: 4.1 MMOL/L (ref 3.5–5.1)
SODIUM BLD-SCNC: 138 MMOL/L (ref 135–145)
TOTAL PROTEIN: 6.7 GM/DL (ref 6.4–8.2)

## 2021-12-08 PROCEDURE — 36415 COLL VENOUS BLD VENIPUNCTURE: CPT

## 2021-12-08 PROCEDURE — 82784 ASSAY IGA/IGD/IGG/IGM EACH: CPT

## 2021-12-08 PROCEDURE — 85025 COMPLETE CBC W/AUTO DIFF WBC: CPT

## 2021-12-08 PROCEDURE — 83615 LACTATE (LD) (LDH) ENZYME: CPT

## 2021-12-08 PROCEDURE — 80053 COMPREHEN METABOLIC PANEL: CPT

## 2021-12-13 ENCOUNTER — OFFICE VISIT (OUTPATIENT)
Dept: ONCOLOGY | Age: 41
End: 2021-12-13
Payer: COMMERCIAL

## 2021-12-13 ENCOUNTER — HOSPITAL ENCOUNTER (OUTPATIENT)
Dept: INFUSION THERAPY | Age: 41
Discharge: HOME | End: 2021-12-13
Payer: COMMERCIAL

## 2021-12-13 VITALS
BODY MASS INDEX: 35.25 KG/M2 | OXYGEN SATURATION: 99 % | HEART RATE: 102 BPM | HEIGHT: 73 IN | WEIGHT: 266 LBS | TEMPERATURE: 99.3 F | SYSTOLIC BLOOD PRESSURE: 129 MMHG | DIASTOLIC BLOOD PRESSURE: 86 MMHG | RESPIRATION RATE: 16 BRPM

## 2021-12-13 DIAGNOSIS — C91.10 CHRONIC LYMPHOCYTIC LEUKEMIA OF B-CELL TYPE NOT HAVING ACHIEVED REMISSION (HCC): Primary | ICD-10-CM

## 2021-12-13 PROCEDURE — 99214 OFFICE O/P EST MOD 30 MIN: CPT | Performed by: INTERNAL MEDICINE

## 2021-12-13 PROCEDURE — G8427 DOCREV CUR MEDS BY ELIG CLIN: HCPCS | Performed by: INTERNAL MEDICINE

## 2021-12-13 PROCEDURE — G8417 CALC BMI ABV UP PARAM F/U: HCPCS | Performed by: INTERNAL MEDICINE

## 2021-12-13 PROCEDURE — G8484 FLU IMMUNIZE NO ADMIN: HCPCS | Performed by: INTERNAL MEDICINE

## 2021-12-13 PROCEDURE — 99211 OFF/OP EST MAY X REQ PHY/QHP: CPT

## 2021-12-13 PROCEDURE — 4004F PT TOBACCO SCREEN RCVD TLK: CPT | Performed by: INTERNAL MEDICINE

## 2021-12-13 ASSESSMENT — PATIENT HEALTH QUESTIONNAIRE - PHQ9
SUM OF ALL RESPONSES TO PHQ QUESTIONS 1-9: 0
1. LITTLE INTEREST OR PLEASURE IN DOING THINGS: 0
2. FEELING DOWN, DEPRESSED OR HOPELESS: 0
SUM OF ALL RESPONSES TO PHQ9 QUESTIONS 1 & 2: 0
SUM OF ALL RESPONSES TO PHQ QUESTIONS 1-9: 0
SUM OF ALL RESPONSES TO PHQ QUESTIONS 1-9: 0

## 2021-12-13 NOTE — PROGRESS NOTES
Patient Name: Lara Paiz  Patient : 1980  Patient MRN: [id number]     Primary Oncologist: Babs Noe MD  Referring Physician: Lizzy Gutierrez MD       Date of Service: 2021      Chief Complaint:   Chief Complaint   Patient presents with    Follow-up        Active Problem list  1. Chronic lymphocytic leukemia of B-cell type not having achieved remission [facility])           HPI:        35 yo w.m. with mild CLL    2018 flow cytometry with monoclonal B-cell population with chronic lymphocytic leukemia/small lymphocytic lymphoma-like immunophenotype. Negative BCR able. FISH with trisomy 12    2019 CT scan of the soft tissue neck with no acute abnormality. 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 Naproxen Oral 500 mg tablet   Aspirin Oral 81 mg tablet,delayed release (DR/EC)      Social History  Lives alone  Two daughter   Currently employed in construction  Smokes tob 1 ppd for on and off for the past 25 years, quit for almost 5 years [id number]  No excess etoh or any other illicit drug abuse      Family History  Maternal Grand mother lung cancer  Maternal uncle with colon cancer   No h/o leukemia. , lymphoma or any blood dyscrasias. Interval History  2021: Arrived alone to the clinic today. Reported sore throat. Denied any fever, night sweats, cough, sore throat, lymphadenopathy or any weight loss. Reported that continues to be tired. Denied any chest pain, increase shortness breath. Denied any abdominal pain. Denied any bleeding.     Review of Systems   Per interval history; otherwise 10 point ROS is negative Vital Signs:  /86 (Site: Right Upper Arm, Position: Sitting, Cuff Size: Medium Adult)   Pulse 102   Temp 99.3 °F (37.4 °C) (Infrared)   Resp 16   Ht 6' 1\" (1.854 m)   Wt 266 lb (120.7 kg)   SpO2 99%   BMI 35.09 kg/m²     Physical Exam:  CONSTITUTIONAL: awake, alert, tired appearing  EYES: JUNIOR, No pallor or any icterus   ENT: ATNC   NECK: No JVD   HEMATOLOGIC/LYMPHATIC: no cervical, supraclavicular or axillary lymphadenopathy   LUNGS:CTAB   CARDIOVASCULAR: s1s2 rrr   ABDOMEN:soft ntnd bs pos no hsm  MUSCULOSKELETAL: full range of motion noted, tone is normal   NEUROLOGIC: GI   EXTREMITIES: no LE edema bilaterally        Labs:    Hematology:  Lab Results   Component Value Date    WBC 12.5 (H) 06/14/2021    RBC 5.08 06/14/2021    HGB 15.4 06/14/2021    HCT 43.1 06/14/2021    MCV 84.8 06/14/2021    MCH 30.3 06/14/2021    MCHC 35.7 06/14/2021    RDW 13.4 06/14/2021     06/14/2021    MPV 10.7 06/14/2021    SEGSPCT 41.1 06/14/2021    EOSRELPCT 2.1 06/14/2021    BASOPCT 0.4 06/14/2021    LYMPHOPCT 48.3 (H) 06/14/2021    MONOPCT 8.1 (H) 06/14/2021    SEGSABS 5.2 06/14/2021    EOSABS 0.3 06/14/2021    BASOSABS 0.1 06/14/2021    LYMPHSABS 6.1 06/14/2021    MONOSABS 1.0 06/14/2021    DIFFTYPE AUTOMATED DIFFERENTIAL 06/14/2021     No results found for: ESR    Chemistry:  Lab Results   Component Value Date     12/08/2021    K 4.1 12/08/2021     12/08/2021    CO2 28 12/08/2021    BUN 25 (H) 12/08/2021    CREATININE 1.0 12/08/2021    GLUCOSE 105 (H) 12/08/2021    CALCIUM 9.7 12/08/2021    PROT 6.7 12/08/2021    LABALBU 4.8 12/08/2021    BILITOT 0.4 12/08/2021    ALKPHOS 50 12/08/2021    AST 22 12/08/2021    ALT 18 12/08/2021    LABGLOM >60 12/08/2021    GFRAA >60 12/08/2021     Lab Results   Component Value Date     12/08/2021     No components found for: LD  Lab Results   Component Value Date    TSHHS 1.460 03/14/2019    T4FREE 1.26 03/14/2019       Immunology:  Lab Results Component Value Date    PROT 6.7 12/08/2021     No results found for: Slade Haggis, KLFLCR  No results found for: B2M    Coagulation Panel:  No results found for: PROTIME, INR, APTT, DDIMER    Anemia Panel:  Lab Results   Component Value Date    [id number] 587.2 03/14/2019    FOLATE >20.0 (H) 03/14/2019       Tumor Markers:  No results found for: , CEA, , LABCA2, PSA      Imaging: Reviewed     Pathology:Reviewed     Observations:  Performance Status: ECOG 0  Depression Status: PHQ-9 Total Score: 0 (12/13/2021  3:13 PM)          Assessment & Plan:  CLL: Trisomy 12. CD38 negative. IGVH unmutated, Unfavourable. Looks like absolute lymphocytosis, stage 0. No indication for treatment As no B symptoms, no cytopenia, rapid doubling. Will monitor per NCCN guidelines. IVIG as needed. GIB: Resolved, last colonoscopy 2019 which was normal.  Next colonoscopy recommended the age of 50    Continue other medical care. Discussed above findings and plan with him and he verbalized understanding. Discussed healthy lifestyle including smoking cessation, healthy diet, regular exercise as tolerated and weight loss. Also discussed importance of being up-to-date with age-appropriate screening tools. Colonoscopy repeat at the age of 50    Recommend follow-up with primary care physician and other specialist.    Please do not hesitate to contact us if you need further information. Return to clinic June 2022 or earlier if new symptoms. I have recommended that the patient follow CDC guidelines for prevention of COVID-19 infection. Received COVID vaccine and booster.   Never got flu vaccine in the past    [address]           Electronically signed by Angelina Durbin MD on 12/14/2021 at 7:05 PM

## 2021-12-13 NOTE — PROGRESS NOTES
MA Rooming Questions  Patient: Jovani Kennedy  MRN: [id number]    Date: 12/13/2021        1. Do you have any new issues?   no         2. Do you need any refills on medications?    no    3. Have you had any imaging done since your last visit?   no    4. Have you been hospitalized or seen in the emergency room since your last visit here?   no    5. Did the patient have a depression screening completed today?  Yes    No data recorded     PHQ-9 Given to (if applicable):               PHQ-9 Score (if applicable):                     [] Positive     [x]  Negative              Does question #9 need addressed (if applicable)                     [] Yes    []  No               Yany Smart MA

## 2022-06-24 ENCOUNTER — HOSPITAL ENCOUNTER (OUTPATIENT)
Dept: INFUSION THERAPY | Age: 42
Discharge: HOME | End: 2022-06-24
Payer: COMMERCIAL

## 2022-06-24 ENCOUNTER — OFFICE VISIT (OUTPATIENT)
Dept: ONCOLOGY | Age: 42
End: 2022-06-24
Payer: COMMERCIAL

## 2022-06-24 VITALS
WEIGHT: 265 LBS | BODY MASS INDEX: 35.12 KG/M2 | TEMPERATURE: 98.5 F | DIASTOLIC BLOOD PRESSURE: 75 MMHG | OXYGEN SATURATION: 98 % | SYSTOLIC BLOOD PRESSURE: 136 MMHG | HEART RATE: 75 BPM | RESPIRATION RATE: 16 BRPM | HEIGHT: 73 IN

## 2022-06-24 DIAGNOSIS — C91.10 CHRONIC LYMPHOCYTIC LEUKEMIA OF B-CELL TYPE NOT HAVING ACHIEVED REMISSION (HCC): Primary | ICD-10-CM

## 2022-06-24 DIAGNOSIS — C91.10 CHRONIC LYMPHOCYTIC LEUKEMIA OF B-CELL TYPE NOT HAVING ACHIEVED REMISSION (HCC): ICD-10-CM

## 2022-06-24 DIAGNOSIS — R53.82 CHRONIC FATIGUE, UNSPECIFIED: ICD-10-CM

## 2022-06-24 DIAGNOSIS — D69.6 THROMBOCYTOPENIA, UNSPECIFIED (HCC): ICD-10-CM

## 2022-06-24 LAB
ALBUMIN SERPL-MCNC: 4.6 GM/DL (ref 3.4–5)
ALP BLD-CCNC: 50 IU/L (ref 40–129)
ALT SERPL-CCNC: 18 U/L (ref 10–40)
ANION GAP SERPL CALCULATED.3IONS-SCNC: 10 MMOL/L (ref 4–16)
AST SERPL-CCNC: 23 IU/L (ref 15–37)
BASOPHILS ABSOLUTE: 0 K/CU MM
BASOPHILS RELATIVE PERCENT: 0.4 % (ref 0–1)
BILIRUB SERPL-MCNC: 0.3 MG/DL (ref 0–1)
BUN BLDV-MCNC: 18 MG/DL (ref 6–23)
CALCIUM SERPL-MCNC: 9.4 MG/DL (ref 8.3–10.6)
CHLORIDE BLD-SCNC: 102 MMOL/L (ref 99–110)
CO2: 26 MMOL/L (ref 21–32)
CREAT SERPL-MCNC: 0.9 MG/DL (ref 0.9–1.3)
DIFFERENTIAL TYPE: ABNORMAL
EOSINOPHILS ABSOLUTE: 0.2 K/CU MM
EOSINOPHILS RELATIVE PERCENT: 2 % (ref 0–3)
GFR AFRICAN AMERICAN: >60 ML/MIN/1.73M2
GFR NON-AFRICAN AMERICAN: >60 ML/MIN/1.73M2
GLUCOSE BLD-MCNC: 96 MG/DL (ref 70–99)
HCT VFR BLD CALC: 43.7 % (ref 42–52)
HEMOGLOBIN: 15.2 GM/DL (ref 13.5–18)
IGA: 117 MG/DL (ref 69–382)
IGG,SERUM: 844 MG/DL (ref 723–1685)
IGM,SERUM: <25 MG/DL (ref 62–277)
LACTATE DEHYDROGENASE: 203 IU/L (ref 120–246)
LYMPHOCYTES ABSOLUTE: 4.6 K/CU MM
LYMPHOCYTES RELATIVE PERCENT: 43.3 % (ref 24–44)
MCH RBC QN AUTO: 30.1 PG (ref 27–31)
MCHC RBC AUTO-ENTMCNC: 34.8 % (ref 32–36)
MCV RBC AUTO: 86.5 FL (ref 78–100)
MONOCYTES ABSOLUTE: 0.9 K/CU MM
MONOCYTES RELATIVE PERCENT: 8.6 % (ref 0–4)
PDW BLD-RTO: 13.6 % (ref 11.7–14.9)
PLATELET # BLD: 165 K/CU MM (ref 140–440)
PMV BLD AUTO: 10.6 FL (ref 7.5–11.1)
POTASSIUM SERPL-SCNC: 4.2 MMOL/L (ref 3.5–5.1)
RBC # BLD: 5.05 M/CU MM (ref 4.6–6.2)
SEGMENTED NEUTROPHILS ABSOLUTE COUNT: 4.9 K/CU MM
SEGMENTED NEUTROPHILS RELATIVE PERCENT: 45.7 % (ref 36–66)
SODIUM BLD-SCNC: 138 MMOL/L (ref 135–145)
TOTAL PROTEIN: 6.5 GM/DL (ref 6.4–8.2)
WBC # BLD: 10.6 K/CU MM (ref 4–10.5)

## 2022-06-24 PROCEDURE — 99213 OFFICE O/P EST LOW 20 MIN: CPT | Performed by: INTERNAL MEDICINE

## 2022-06-24 PROCEDURE — 4004F PT TOBACCO SCREEN RCVD TLK: CPT | Performed by: INTERNAL MEDICINE

## 2022-06-24 PROCEDURE — G8417 CALC BMI ABV UP PARAM F/U: HCPCS | Performed by: INTERNAL MEDICINE

## 2022-06-24 PROCEDURE — 80053 COMPREHEN METABOLIC PANEL: CPT

## 2022-06-24 PROCEDURE — 36415 COLL VENOUS BLD VENIPUNCTURE: CPT

## 2022-06-24 PROCEDURE — 82784 ASSAY IGA/IGD/IGG/IGM EACH: CPT

## 2022-06-24 PROCEDURE — 85025 COMPLETE CBC W/AUTO DIFF WBC: CPT

## 2022-06-24 PROCEDURE — G8427 DOCREV CUR MEDS BY ELIG CLIN: HCPCS | Performed by: INTERNAL MEDICINE

## 2022-06-24 PROCEDURE — 83615 LACTATE (LD) (LDH) ENZYME: CPT

## 2022-06-24 ASSESSMENT — PATIENT HEALTH QUESTIONNAIRE - PHQ9
SUM OF ALL RESPONSES TO PHQ QUESTIONS 1-9: 0
SUM OF ALL RESPONSES TO PHQ QUESTIONS 1-9: 0
1. LITTLE INTEREST OR PLEASURE IN DOING THINGS: 0
SUM OF ALL RESPONSES TO PHQ QUESTIONS 1-9: 0
2. FEELING DOWN, DEPRESSED OR HOPELESS: 0
SUM OF ALL RESPONSES TO PHQ9 QUESTIONS 1 & 2: 0
SUM OF ALL RESPONSES TO PHQ QUESTIONS 1-9: 0

## 2022-06-24 NOTE — PROGRESS NOTES
MA Rooming Questions  Patient: Daina Botello  MRN: [id number]    Date: 6/24/2022        1. Do you have any new issues?   no         2. Do you need any refills on medications?    no    3. Have you had any imaging done since your last visit?   no    4. Have you been hospitalized or seen in the emergency room since your last visit here?   no    5. Did the patient have a depression screening completed today?  Yes    PHQ-9 Total Score: 0 (6/24/2022  2:55 PM)       PHQ-9 Given to (if applicable):               PHQ-9 Score (if applicable):                     [] Positive     []  Negative              Does question #9 need addressed (if applicable)                     [] Yes    []  No               Americo Lesches, CMA

## 2022-06-24 NOTE — PROGRESS NOTES
Patient Name: Johnie Hernandez  Patient : 1980  Patient MRN: [id number]     Primary Oncologist: Rakesh Ferrell MD  Referring Physician: Adan Leal MD       Date of Service: 2022      Chief Complaint:   Chief Complaint   Patient presents with    Discuss Labs        Active Problem list  1. Chronic lymphocytic leukemia of B-cell type not having achieved remission ([facility] [address]    2. Thrombocytopenia, unspecified ([facility] [address]    3. Chronic fatigue, unspecified           HPI:       2018 flow cytometry with monoclonal B-cell population with chronic lymphocytic leukemia/small lymphocytic lymphoma-like immunophenotype. Negative BCR able. FISH with trisomy 12    2019 CT scan of the soft tissue neck with no acute abnormality. 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 Naproxen Oral 500 mg tablet   Aspirin Oral 81 mg tablet,delayed release (DR/EC)      Social History  Lives alone  Two daughter   Currently employed in construction  Smokes tob 1 ppd for on and off for the past 25 years, quit for almost 5 years [id number]  No excess etoh or any other illicit drug abuse      Family History  Maternal Grand mother lung cancer  Maternal uncle with colon cancer   No h/o leukemia. , lymphoma or any blood dyscrasias. Interval History  2022: Arrived alone to the clinic today. Works every day and is very active. Denied any fever, night sweats,  lymphadenopathy or any weight loss. Denied any frequent infections. Denied any chest pain, increase shortness breath. Denied any abdominal pain. Denied any bleeding.     Review of Systems   Per interval history; otherwise 10 point ROS is negative              Vital Signs:  /75 (Site: Left Upper Arm, Position: Sitting, Cuff Size: Medium Adult)   Pulse 75   Temp 98.5 °F (36.9 °C) (Infrared)   Resp 16   Ht 6' 1\" (1.854 m)   Wt 265 lb (120.2 kg)   SpO2 98%   BMI 34.96 kg/m²     Physical Exam:  CONSTITUTIONAL: awake, alert, tired appearing  EYES: JUNIOR, No pallor or any icterus   ENT: ATNC   NECK: No JVD   HEMATOLOGIC/LYMPHATIC: no cervical, supraclavicular or axillary lymphadenopathy   LUNGS:CTAB   CARDIOVASCULAR: s1s2 rrr   ABDOMEN:soft ntnd bs pos no hsm  MUSCULOSKELETAL: full range of motion noted, tone is normal   NEUROLOGIC: GI   EXTREMITIES: no LE edema bilaterally        Labs:    Hematology:  Lab Results   Component Value Date    WBC 10.6 (H) 06/24/2022    RBC 5.05 06/24/2022    HGB 15.2 06/24/2022    HCT 43.7 06/24/2022    MCV 86.5 06/24/2022    MCH 30.1 06/24/2022    MCHC 34.8 06/24/2022    RDW 13.6 06/24/2022     06/24/2022    MPV 10.6 06/24/2022    SEGSPCT 45.7 06/24/2022    EOSRELPCT 2.0 06/24/2022    BASOPCT 0.4 06/24/2022    LYMPHOPCT 43.3 06/24/2022    MONOPCT 8.6 (H) 06/24/2022    SEGSABS 4.9 06/24/2022    EOSABS 0.2 06/24/2022    BASOSABS 0.0 06/24/2022    LYMPHSABS 4.6 06/24/2022    MONOSABS 0.9 06/24/2022    DIFFTYPE AUTOMATED DIFFERENTIAL 06/24/2022     No results found for: ESR    Chemistry:  Lab Results   Component Value Date     12/08/2021    K 4.1 12/08/2021     12/08/2021    CO2 28 12/08/2021    BUN 25 (H) 12/08/2021    CREATININE 1.0 12/08/2021    GLUCOSE 105 (H) 12/08/2021    CALCIUM 9.7 12/08/2021    PROT 6.7 12/08/2021    LABALBU 4.8 12/08/2021    BILITOT 0.4 12/08/2021    ALKPHOS 50 12/08/2021    AST 22 12/08/2021    ALT 18 12/08/2021    LABGLOM >60 12/08/2021    GFRAA >60 12/08/2021     Lab Results   Component Value Date     12/08/2021     No components found for: LD  Lab Results   Component Value Date    TSHHS 1.460 03/14/2019    T4FREE 1.26 03/14/2019 Immunology:  Lab Results   Component Value Date    PROT 6.7 12/08/2021     No results found for: Wasola Bravo, KLFLCR  No results found for: B2M    Coagulation Panel:  No results found for: PROTIME, INR, APTT, DDIMER    Anemia Panel:  Lab Results   Component Value Date    BKDXJGRE20 587.2 03/14/2019    FOLATE >20.0 (H) 03/14/2019       Tumor Markers:  No results found for: , CEA, , LABCA2, PSA      Imaging: Reviewed     Pathology:Reviewed     Observations:  Performance Status: ECOG 0  Depression Status: PHQ-9 Total Score: 0 (6/24/2022  2:55 PM)          Assessment & Plan:  CLL: Trisomy 12. CD38 negative. IGVH unmutated, Unfavourable. Absolute lymphocytosis, stage 0. No indication for treatment As no B symptoms, no cytopenia, rapid doubling. Will monitor per NCCN guidelines. IVIG if IgG <500    Continue other medical care. Discussed above findings and plan with him and he verbalized understanding. Discussed healthy lifestyle including smoking cessation, healthy diet, regular exercise as tolerated and weight loss. Also discussed importance of being up-to-date with age-appropriate screening tools. Colonoscopy repeat at the age of 50    Recommend follow-up with primary care physician and other specialist.    Please do not hesitate to contact us if you need further information. Return to clinic December 2022 or earlier if new symptoms.     [id number]           Electronically signed by Matthew Ballesteros MD on 6/24/2022 at 3:21 PM

## 2022-12-28 ENCOUNTER — OFFICE VISIT (OUTPATIENT)
Dept: ONCOLOGY | Age: 42
End: 2022-12-28
Payer: COMMERCIAL

## 2022-12-28 ENCOUNTER — HOSPITAL ENCOUNTER (OUTPATIENT)
Dept: INFUSION THERAPY | Age: 42
Discharge: HOME | End: 2022-12-28
Payer: COMMERCIAL

## 2022-12-28 VITALS
HEIGHT: 73 IN | HEART RATE: 111 BPM | DIASTOLIC BLOOD PRESSURE: 82 MMHG | TEMPERATURE: 97.3 F | WEIGHT: 254 LBS | RESPIRATION RATE: 18 BRPM | OXYGEN SATURATION: 97 % | BODY MASS INDEX: 33.66 KG/M2 | SYSTOLIC BLOOD PRESSURE: 142 MMHG

## 2022-12-28 DIAGNOSIS — D69.6 THROMBOCYTOPENIA, UNSPECIFIED (HCC): ICD-10-CM

## 2022-12-28 DIAGNOSIS — C91.10 CHRONIC LYMPHOCYTIC LEUKEMIA OF B-CELL TYPE NOT HAVING ACHIEVED REMISSION (HCC): Primary | ICD-10-CM

## 2022-12-28 DIAGNOSIS — R53.82 CHRONIC FATIGUE, UNSPECIFIED: ICD-10-CM

## 2022-12-28 DIAGNOSIS — C91.10 CHRONIC LYMPHOCYTIC LEUKEMIA OF B-CELL TYPE NOT HAVING ACHIEVED REMISSION (HCC): ICD-10-CM

## 2022-12-28 LAB
ALBUMIN SERPL-MCNC: 4.6 GM/DL (ref 3.4–5)
ALP BLD-CCNC: 48 IU/L (ref 40–129)
ALT SERPL-CCNC: 17 U/L (ref 10–40)
ANION GAP SERPL CALCULATED.3IONS-SCNC: 13 MMOL/L (ref 4–16)
AST SERPL-CCNC: 19 IU/L (ref 15–37)
BASOPHILS ABSOLUTE: 0.1 K/CU MM
BASOPHILS RELATIVE PERCENT: 0.3 % (ref 0–1)
BILIRUB SERPL-MCNC: 0.3 MG/DL (ref 0–1)
BUN BLDV-MCNC: 19 MG/DL (ref 6–23)
CALCIUM SERPL-MCNC: 9.7 MG/DL (ref 8.3–10.6)
CHLORIDE BLD-SCNC: 102 MMOL/L (ref 99–110)
CO2: 25 MMOL/L (ref 21–32)
CREAT SERPL-MCNC: 1 MG/DL (ref 0.9–1.3)
DIFFERENTIAL TYPE: ABNORMAL
EOSINOPHILS ABSOLUTE: 0.3 K/CU MM
EOSINOPHILS RELATIVE PERCENT: 1.6 % (ref 0–3)
GFR SERPL CREATININE-BSD FRML MDRD: >60 ML/MIN/1.73M2
GLUCOSE BLD-MCNC: 88 MG/DL (ref 70–99)
HCT VFR BLD CALC: 44.5 % (ref 42–52)
HEMOGLOBIN: 15.5 GM/DL (ref 13.5–18)
IGA: 125 MG/DL (ref 69–382)
IGG,SERUM: 794 MG/DL (ref 723–1685)
IGM,SERUM: <25 MG/DL (ref 62–277)
LACTATE DEHYDROGENASE: 170 IU/L (ref 120–246)
LYMPHOCYTES ABSOLUTE: 8.1 K/CU MM
LYMPHOCYTES RELATIVE PERCENT: 50.7 % (ref 24–44)
MCH RBC QN AUTO: 30.3 PG (ref 27–31)
MCHC RBC AUTO-ENTMCNC: 34.8 % (ref 32–36)
MCV RBC AUTO: 86.9 FL (ref 78–100)
MONOCYTES ABSOLUTE: 1.4 K/CU MM
MONOCYTES RELATIVE PERCENT: 8.7 % (ref 0–4)
PDW BLD-RTO: 13.7 % (ref 11.7–14.9)
PLATELET # BLD: 156 K/CU MM (ref 140–440)
PMV BLD AUTO: 10.5 FL (ref 7.5–11.1)
POTASSIUM SERPL-SCNC: 4.2 MMOL/L (ref 3.5–5.1)
RBC # BLD: 5.12 M/CU MM (ref 4.6–6.2)
SEGMENTED NEUTROPHILS ABSOLUTE COUNT: 6.2 K/CU MM
SEGMENTED NEUTROPHILS RELATIVE PERCENT: 38.7 % (ref 36–66)
SODIUM BLD-SCNC: 140 MMOL/L (ref 135–145)
TOTAL PROTEIN: 6.7 GM/DL (ref 6.4–8.2)
WBC # BLD: 16 K/CU MM (ref 4–10.5)

## 2022-12-28 PROCEDURE — 4004F PT TOBACCO SCREEN RCVD TLK: CPT | Performed by: INTERNAL MEDICINE

## 2022-12-28 PROCEDURE — 99214 OFFICE O/P EST MOD 30 MIN: CPT | Performed by: INTERNAL MEDICINE

## 2022-12-28 PROCEDURE — 83615 LACTATE (LD) (LDH) ENZYME: CPT

## 2022-12-28 PROCEDURE — 99211 OFF/OP EST MAY X REQ PHY/QHP: CPT

## 2022-12-28 PROCEDURE — G8427 DOCREV CUR MEDS BY ELIG CLIN: HCPCS | Performed by: INTERNAL MEDICINE

## 2022-12-28 PROCEDURE — G8417 CALC BMI ABV UP PARAM F/U: HCPCS | Performed by: INTERNAL MEDICINE

## 2022-12-28 PROCEDURE — 80053 COMPREHEN METABOLIC PANEL: CPT

## 2022-12-28 PROCEDURE — 36415 COLL VENOUS BLD VENIPUNCTURE: CPT

## 2022-12-28 PROCEDURE — 82784 ASSAY IGA/IGD/IGG/IGM EACH: CPT

## 2022-12-28 PROCEDURE — G8484 FLU IMMUNIZE NO ADMIN: HCPCS | Performed by: INTERNAL MEDICINE

## 2022-12-28 PROCEDURE — 85025 COMPLETE CBC W/AUTO DIFF WBC: CPT

## 2022-12-28 RX ORDER — NAPROXEN 250 MG/1
250 TABLET ORAL 2 TIMES DAILY WITH MEALS
Qty: 60 TABLET | Status: CANCELLED | OUTPATIENT
Start: 2022-12-28

## 2022-12-28 NOTE — PROGRESS NOTES
MA Rooming Questions  Patient: Adela Light  MRN: [id number]    Date: 12/28/2022        1. Do you have any new issues?   no         2. Do you need any refills on medications?    no    3. Have you had any imaging done since your last visit?   no    4. Have you been hospitalized or seen in the emergency room since your last visit here?   no    5. Did the patient have a depression screening completed today?  No    No data recorded     PHQ-9 Given to (if applicable):               PHQ-9 Score (if applicable):                     [] Positive     []  Negative              Does question #9 need addressed (if applicable)                     [] Yes    []  No               Vicente Erickson MA

## 2022-12-28 NOTE — PROGRESS NOTES
Patient Name: Nick Lange  Patient : 1980  Patient MRN: [id number]     Primary Oncologist: Jolene Ann MD  Referring Physician: Neha You MD       Date of Service: 2022      Chief Complaint:   Chief Complaint   Patient presents with    Follow-up        Active Problem list  1. Chronic lymphocytic leukemia of B-cell type not having achieved remission ([facility]    2. Thrombocytopenia, unspecified ([facility]    3. Chronic fatigue, unspecified           HPI:       2018 flow cytometry with monoclonal B-cell population with chronic lymphocytic leukemia/small lymphocytic lymphoma-like immunophenotype. Negative BCR able. FISH with trisomy 12    2019 CT scan of the soft tissue neck with no acute abnormality. 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 Naproxen Oral 500 mg tablet   Aspirin Oral 81 mg tablet,delayed release (DR/EC)      Social History  Lives alone  Two daughter   Currently employed in construction  Smokes tob 1 ppd for on and off for the past 25 years, quit for almost 5 years 0117-8547  No excess etoh or any other illicit drug abuse      Family History  Maternal Grand mother lung cancer  Maternal uncle with colon cancer   No h/o leukemia. , lymphoma or any blood dyscrasias. Interval History  2022: Arrived alone to the clinic today. Denied any fever, night sweats,  lymphadenopathy. Lost 11 lbs, eating better has cut down on fast food. Denied any frequent infections. Denied any chest pain, increase shortness breath. Denied any abdominal pain. Denied any bleeding. Not very active in winter.      Review of Systems   Per interval history; otherwise 10 point ROS is negative              Vital Signs:  BP (!) 142/82 (Site: Left Upper Arm, Position: Sitting, Cuff Size: Large Adult)   Pulse (!) 111   Temp 97.3 °F (36.3 °C) (Infrared)   Resp 18   Ht 6' 1\" (1.854 m)   Wt 254 lb (115.2 kg)   SpO2 97%   BMI 33.51 kg/m²     Physical Exam:  CONSTITUTIONAL: awake, alert, tired appearing  EYES: JUNIOR, No pallor or any icterus   ENT: ATNC   NECK: No JVD   HEMATOLOGIC/LYMPHATIC: no cervical, supraclavicular or axillary lymphadenopathy   LUNGS:CTAB   CARDIOVASCULAR: s1s2 rrr   ABDOMEN:soft ntnd bs pos no hsm  MUSCULOSKELETAL: full range of motion noted, tone is normal   NEUROLOGIC: GI   EXTREMITIES: no LE edema bilaterally        Labs:    Hematology:  Lab Results   Component Value Date    WBC 16.0 (H) 12/28/2022    RBC 5.12 12/28/2022    HGB 15.5 12/28/2022    HCT 44.5 12/28/2022    MCV 86.9 12/28/2022    MCH 30.3 12/28/2022    MCHC 34.8 12/28/2022    RDW 13.7 12/28/2022     12/28/2022    MPV 10.5 12/28/2022    SEGSPCT 38.7 12/28/2022    EOSRELPCT 1.6 12/28/2022    BASOPCT 0.3 12/28/2022    LYMPHOPCT 50.7 (H) 12/28/2022    MONOPCT 8.7 (H) 12/28/2022    SEGSABS 6.2 12/28/2022    EOSABS 0.3 12/28/2022    BASOSABS 0.1 12/28/2022    LYMPHSABS 8.1 12/28/2022    MONOSABS 1.4 12/28/2022    DIFFTYPE AUTOMATED DIFFERENTIAL 12/28/2022     No results found for: ESR    Chemistry:  Lab Results   Component Value Date     06/24/2022    K 4.2 06/24/2022     06/24/2022    CO2 26 06/24/2022    BUN 18 06/24/2022    CREATININE 0.9 06/24/2022    GLUCOSE 96 06/24/2022    CALCIUM 9.4 06/24/2022    PROT 6.5 06/24/2022    LABALBU 4.6 06/24/2022    BILITOT 0.3 06/24/2022    ALKPHOS 50 06/24/2022    AST 23 06/24/2022    ALT 18 06/24/2022    LABGLOM >60 06/24/2022    GFRAA >60 06/24/2022     Lab Results   Component Value Date     06/24/2022     No components found for: LD  Lab Results   Component Value Date    TSH 1.460 03/14/2019    T4FREE 1.26 03/14/2019       Immunology:  Lab Results   Component Value Date    PROT 6.5 06/24/2022     No results found for: Kevin Olguin, KLFLCR  No results found for: B2M    Coagulation Panel:  No results found for: PROTIME, INR, APTT, DDIMER    Anemia Panel:  Lab Results   Component Value Date    VFRPVPFG34 587.2 03/14/2019    FOLATE >20.0 (H) 03/14/2019       Tumor Markers:  No results found for: , CEA, , LABCA2, PSA      Imaging: Reviewed     Pathology:Reviewed     Observations:  Performance Status: ECOG 0  Depression Status: No data recorded          Assessment & Plan:  CLL: Trisomy 12. CD38 negative. IGVH unmutated, Unfavourable. Absolute lymphocytosis, stage 0. No indication for treatment As no B symptoms other than intentional weight loss looks like , no cytopenia, rapid doubling. Will monitor per NCCN guidelines. IVIG if IgG <500    HTN: drinks a lot of caffeine. Not on any antihypertensives. Maintain a log and discuss with PCP if remains elevated. Low salt diet, exercise. Quit smoking and cut down caffeine     Continue other medical care. Discussed above findings and plan with him and he verbalized understanding. Discussed healthy lifestyle including smoking cessation, healthy diet, regular exercise as tolerated and weight loss. Also discussed importance of being up-to-date with age-appropriate screening tools. Colonoscopy looks like in 2022 with polyp, follow with GI    Recommend follow-up with primary care physician and other specialist.    Please do not hesitate to contact us if you need further information. Return to clinic June 2023  or earlier if new symptoms.     [address]

## 2023-03-31 ENCOUNTER — HOSPITAL ENCOUNTER (OUTPATIENT)
Dept: INFUSION THERAPY | Age: 43
Discharge: HOME | End: 2023-03-31
Payer: COMMERCIAL

## 2023-03-31 DIAGNOSIS — R53.82 CHRONIC FATIGUE, UNSPECIFIED: ICD-10-CM

## 2023-03-31 DIAGNOSIS — C91.10 CHRONIC LYMPHOCYTIC LEUKEMIA OF B-CELL TYPE NOT HAVING ACHIEVED REMISSION (HCC): ICD-10-CM

## 2023-03-31 DIAGNOSIS — D69.6 THROMBOCYTOPENIA, UNSPECIFIED (HCC): ICD-10-CM

## 2023-03-31 LAB
ALBUMIN SERPL-MCNC: 4.7 GM/DL (ref 3.4–5)
ALP BLD-CCNC: 57 IU/L (ref 40–129)
ALT SERPL-CCNC: 17 U/L (ref 10–40)
ANION GAP SERPL CALCULATED.3IONS-SCNC: 11 MMOL/L (ref 4–16)
AST SERPL-CCNC: 22 IU/L (ref 15–37)
BASOPHILS ABSOLUTE: 0 K/CU MM
BASOPHILS RELATIVE PERCENT: 0.2 % (ref 0–1)
BILIRUB SERPL-MCNC: 0.3 MG/DL (ref 0–1)
BUN SERPL-MCNC: 19 MG/DL (ref 6–23)
CALCIUM SERPL-MCNC: 9.7 MG/DL (ref 8.3–10.6)
CHLORIDE BLD-SCNC: 103 MMOL/L (ref 99–110)
CO2: 27 MMOL/L (ref 21–32)
CREAT SERPL-MCNC: 1 MG/DL (ref 0.9–1.3)
DIFFERENTIAL TYPE: ABNORMAL
EOSINOPHILS ABSOLUTE: 0.3 K/CU MM
EOSINOPHILS RELATIVE PERCENT: 1.7 % (ref 0–3)
GFR SERPL CREATININE-BSD FRML MDRD: >60 ML/MIN/1.73M2
GLUCOSE SERPL-MCNC: 115 MG/DL (ref 70–99)
HCT VFR BLD CALC: 43.5 % (ref 42–52)
HEMOGLOBIN: 15.1 GM/DL (ref 13.5–18)
LACTATE DEHYDROGENASE: 180 IU/L (ref 120–246)
LYMPHOCYTES ABSOLUTE: 6.9 K/CU MM
LYMPHOCYTES RELATIVE PERCENT: 46.4 % (ref 24–44)
MCH RBC QN AUTO: 30.5 PG (ref 27–31)
MCHC RBC AUTO-ENTMCNC: 34.7 % (ref 32–36)
MCV RBC AUTO: 87.9 FL (ref 78–100)
MONOCYTES ABSOLUTE: 1.3 K/CU MM
MONOCYTES RELATIVE PERCENT: 8.8 % (ref 0–4)
PDW BLD-RTO: 13.6 % (ref 11.7–14.9)
PLATELET # BLD: 162 K/CU MM (ref 140–440)
PMV BLD AUTO: 10.6 FL (ref 7.5–11.1)
POTASSIUM SERPL-SCNC: 4.2 MMOL/L (ref 3.5–5.1)
RBC # BLD: 4.95 M/CU MM (ref 4.6–6.2)
SEGMENTED NEUTROPHILS ABSOLUTE COUNT: 6.4 K/CU MM
SEGMENTED NEUTROPHILS RELATIVE PERCENT: 42.9 % (ref 36–66)
SODIUM BLD-SCNC: 141 MMOL/L (ref 135–145)
TOTAL PROTEIN: 6.8 GM/DL (ref 6.4–8.2)
WBC # BLD: 15 K/CU MM (ref 4–10.5)

## 2023-03-31 PROCEDURE — 80053 COMPREHEN METABOLIC PANEL: CPT

## 2023-03-31 PROCEDURE — 85025 COMPLETE CBC W/AUTO DIFF WBC: CPT

## 2023-03-31 PROCEDURE — 83615 LACTATE (LD) (LDH) ENZYME: CPT

## 2023-03-31 PROCEDURE — 36415 COLL VENOUS BLD VENIPUNCTURE: CPT

## 2023-07-21 ENCOUNTER — HOSPITAL ENCOUNTER (OUTPATIENT)
Dept: INFUSION THERAPY | Age: 43
Discharge: HOME | End: 2023-07-21
Payer: COMMERCIAL

## 2023-07-21 ENCOUNTER — OFFICE VISIT (OUTPATIENT)
Dept: ONCOLOGY | Age: 43
End: 2023-07-21
Payer: COMMERCIAL

## 2023-07-21 VITALS
HEART RATE: 83 BPM | TEMPERATURE: 98.1 F | OXYGEN SATURATION: 98 % | DIASTOLIC BLOOD PRESSURE: 87 MMHG | SYSTOLIC BLOOD PRESSURE: 140 MMHG | BODY MASS INDEX: 31.49 KG/M2 | HEIGHT: 73 IN | WEIGHT: 237.6 LBS | RESPIRATION RATE: 16 BRPM

## 2023-07-21 DIAGNOSIS — R53.82 CHRONIC FATIGUE, UNSPECIFIED: ICD-10-CM

## 2023-07-21 DIAGNOSIS — C91.10 CHRONIC LYMPHOCYTIC LEUKEMIA OF B-CELL TYPE NOT HAVING ACHIEVED REMISSION (HCC): Primary | ICD-10-CM

## 2023-07-21 DIAGNOSIS — C91.10 CHRONIC LYMPHOCYTIC LEUKEMIA OF B-CELL TYPE NOT HAVING ACHIEVED REMISSION (HCC): ICD-10-CM

## 2023-07-21 DIAGNOSIS — D69.6 THROMBOCYTOPENIA, UNSPECIFIED (HCC): ICD-10-CM

## 2023-07-21 LAB
ALBUMIN SERPL-MCNC: 4.7 GM/DL (ref 3.4–5)
ALP BLD-CCNC: 51 IU/L (ref 40–129)
ALT SERPL-CCNC: 17 U/L (ref 10–40)
ANION GAP SERPL CALCULATED.3IONS-SCNC: 11 MMOL/L (ref 4–16)
AST SERPL-CCNC: 19 IU/L (ref 15–37)
ATYPICAL LYMPHOCYTE ABSOLUTE COUNT: ABNORMAL
BANDED NEUTROPHILS ABSOLUTE COUNT: 0.14 K/CU MM
BANDED NEUTROPHILS RELATIVE PERCENT: 1 % (ref 5–11)
BILIRUB SERPL-MCNC: 0.3 MG/DL (ref 0–1)
BUN SERPL-MCNC: 21 MG/DL (ref 6–23)
CALCIUM SERPL-MCNC: 9.3 MG/DL (ref 8.3–10.6)
CHLORIDE BLD-SCNC: 102 MMOL/L (ref 99–110)
CO2: 24 MMOL/L (ref 21–32)
CREAT SERPL-MCNC: 1.1 MG/DL (ref 0.9–1.3)
DIFFERENTIAL TYPE: ABNORMAL
EOSINOPHILS ABSOLUTE: 0.4 K/CU MM
EOSINOPHILS RELATIVE PERCENT: 3 % (ref 0–3)
GFR SERPL CREATININE-BSD FRML MDRD: >60 ML/MIN/1.73M2
GLUCOSE SERPL-MCNC: 85 MG/DL (ref 70–99)
HCT VFR BLD CALC: 43.6 % (ref 42–52)
HEMOGLOBIN: 15.3 GM/DL (ref 13.5–18)
LACTATE DEHYDROGENASE: 172 IU/L (ref 120–246)
LYMPHOCYTES ABSOLUTE: 7.6 K/CU MM
LYMPHOCYTES RELATIVE PERCENT: 55 % (ref 24–44)
MCH RBC QN AUTO: 30.5 PG (ref 27–31)
MCHC RBC AUTO-ENTMCNC: 35.1 % (ref 32–36)
MCV RBC AUTO: 87 FL (ref 78–100)
MONOCYTES ABSOLUTE: 0.6 K/CU MM
MONOCYTES RELATIVE PERCENT: 4 % (ref 0–4)
NUCLEATED RED BLOOD CELLS: 1
PDW BLD-RTO: 13.5 % (ref 11.7–14.9)
PLATELET # BLD: 162 K/CU MM (ref 140–440)
PMV BLD AUTO: 11.1 FL (ref 7.5–11.1)
POTASSIUM SERPL-SCNC: 4.4 MMOL/L (ref 3.5–5.1)
RBC # BLD: 5.01 M/CU MM (ref 4.6–6.2)
SEGMENTED NEUTROPHILS ABSOLUTE COUNT: 5.1 K/CU MM
SEGMENTED NEUTROPHILS RELATIVE PERCENT: 37 % (ref 36–66)
SODIUM BLD-SCNC: 137 MMOL/L (ref 135–145)
STOMATOCYTES: ABNORMAL
TOTAL PROTEIN: 6.4 GM/DL (ref 6.4–8.2)
WBC # BLD: 13.8 K/CU MM (ref 4–10.5)

## 2023-07-21 PROCEDURE — 36415 COLL VENOUS BLD VENIPUNCTURE: CPT

## 2023-07-21 PROCEDURE — 85007 BL SMEAR W/DIFF WBC COUNT: CPT

## 2023-07-21 PROCEDURE — 80053 COMPREHEN METABOLIC PANEL: CPT

## 2023-07-21 PROCEDURE — 4004F PT TOBACCO SCREEN RCVD TLK: CPT | Performed by: INTERNAL MEDICINE

## 2023-07-21 PROCEDURE — 83615 LACTATE (LD) (LDH) ENZYME: CPT

## 2023-07-21 PROCEDURE — G8417 CALC BMI ABV UP PARAM F/U: HCPCS | Performed by: INTERNAL MEDICINE

## 2023-07-21 PROCEDURE — 85027 COMPLETE CBC AUTOMATED: CPT

## 2023-07-21 PROCEDURE — 99211 OFF/OP EST MAY X REQ PHY/QHP: CPT

## 2023-07-21 PROCEDURE — 99213 OFFICE O/P EST LOW 20 MIN: CPT | Performed by: INTERNAL MEDICINE

## 2023-07-21 PROCEDURE — G8427 DOCREV CUR MEDS BY ELIG CLIN: HCPCS | Performed by: INTERNAL MEDICINE

## 2023-07-21 ASSESSMENT — PATIENT HEALTH QUESTIONNAIRE - PHQ9
SUM OF ALL RESPONSES TO PHQ QUESTIONS 1-9: 0
2. FEELING DOWN, DEPRESSED OR HOPELESS: 0
SUM OF ALL RESPONSES TO PHQ QUESTIONS 1-9: 0
1. LITTLE INTEREST OR PLEASURE IN DOING THINGS: 0
SUM OF ALL RESPONSES TO PHQ QUESTIONS 1-9: 0
SUM OF ALL RESPONSES TO PHQ QUESTIONS 1-9: 0
SUM OF ALL RESPONSES TO PHQ9 QUESTIONS 1 & 2: 0

## 2023-07-21 NOTE — PROGRESS NOTES
Patient Name: Andrew Angelo  Patient : 1980  Patient MRN: [id number]     Primary Oncologist: Dominga Espinosa MD  Referring Physician: Paul Milan MD       Date of Service: 2023      Chief Complaint:   Chief Complaint   Patient presents with    Follow-up        Active Problem list  1. Chronic lymphocytic leukemia of B-cell type not having achieved remission ([address])    2. Thrombocytopenia, unspecified ([address])    3. Chronic fatigue, unspecified           HPI:       2018 flow cytometry with monoclonal B-cell population with chronic lymphocytic leukemia/small lymphocytic lymphoma-like immunophenotype. Negative BCR able. FISH with trisomy 12    2019 CT scan of the soft tissue neck with no acute abnormality. 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     2021 CBC WBC of 13.6 hemoglobin of 15.2 hematocrit 43.1 MCV of 85.2 and platelets of 281       IgG 859  CMP BUN 25    2022   IgG 844, IgA 117, IgM less than 25  CMP WNL  WBC 10.6 hemoglobin 15.2 hematocrit 43.7 MCV 86.5 platelets 790 ANC 8058 ALC 4600    2022 WBC 16.0 hematocrit 44.5, hemoglobin 15.5 MCV 86.9 platelets 641 ANC 4431 ALC 8100  CMP WNL    IgG 794, IgA 125, IgM less than 25    3/31/2023 WBC 15.0 hematocrit 43.5 hemoglobin 15.1 MCV 87.9 platelets 360 ANC 7608 ALC 6900  CMP basically WNL      Past Medical History  none    Surgical History  Colonscopy  with 5  hyperplastic polyps    Tonsillectomy  Abcess drained      Allergies  No known medication allergies    Medications  Nicotine Transdermal Patch 14 mg/24 hr 14 mg/24 hr patch 24 hour 14 mg transdermally once.    Naproxen Oral 500 mg tablet   Aspirin Oral 81 mg tablet,delayed release (DR/EC)      Social History  Lives alone  Two daughter   Currently employed in construction  Smokes tob 1 ppd for on and off for the past 25 years, quit for almost 5 years 1839-4938  No excess etoh or any other illicit drug abuse      Family History  Maternal

## 2023-07-21 NOTE — PROGRESS NOTES
MA Rooming Questions  Patient: Meri Ahumada  MRN: [id number]    Date: 7/21/2023        1. Do you have any new issues?   no         2. Do you need any refills on medications?    no    3. Have you had any imaging done since your last visit?   no    4. Have you been hospitalized or seen in the emergency room since your last visit here?   no    5. Did the patient have a depression screening completed today?  Yes    No data recorded     PHQ-9 Given to (if applicable):               PHQ-9 Score (if applicable):                     [] Positive     []  Negative              Does question #9 need addressed (if applicable)                     [] Yes    []  No               Antonio Smart MA

## 2024-06-28 ENCOUNTER — CLINICAL DOCUMENTATION (OUTPATIENT)
Facility: HOSPITAL | Age: 44
End: 2024-06-28

## 2024-06-28 NOTE — PROGRESS NOTES
Insurance Optimization       Spoke with Jose today on the phone. He has currently lost his medicaid coverage and would like to seek the best insurance plan for his needs. I referred him to Irma Munroe at [employer] in [place] . She is an expert on [employer] and [employer] plans and will point Jose in the right direction and help him get insured.  I also spoke to Jose about applying for [employer] and I will be mailing him an application.

## 2024-10-17 ENCOUNTER — APPOINTMENT (OUTPATIENT)
Dept: GENERAL RADIOLOGY | Age: 44
DRG: 871 | End: 2024-10-17
Payer: COMMERCIAL

## 2024-10-17 ENCOUNTER — HOSPITAL ENCOUNTER (INPATIENT)
Age: 44
LOS: 3 days | Discharge: HOME | DRG: 871 | End: 2024-10-20
Attending: STUDENT IN AN ORGANIZED HEALTH CARE EDUCATION/TRAINING PROGRAM | Admitting: INTERNAL MEDICINE
Payer: COMMERCIAL

## 2024-10-17 DIAGNOSIS — J18.9 PNEUMONIA OF RIGHT UPPER LOBE DUE TO INFECTIOUS ORGANISM: ICD-10-CM

## 2024-10-17 DIAGNOSIS — R00.0 TACHYCARDIA: ICD-10-CM

## 2024-10-17 DIAGNOSIS — A41.9 SEPSIS WITHOUT ACUTE ORGAN DYSFUNCTION, DUE TO UNSPECIFIED ORGANISM (HCC): Primary | ICD-10-CM

## 2024-10-17 LAB
ALBUMIN SERPL-MCNC: 4 G/DL (ref 3.4–5)
ALBUMIN/GLOB SERPL: 1.4 {RATIO} (ref 1.1–2.2)
ALP SERPL-CCNC: 61 U/L (ref 40–129)
ALT SERPL-CCNC: 16 U/L (ref 10–40)
ANION GAP SERPL CALCULATED.3IONS-SCNC: 12 MMOL/L (ref 9–17)
AST SERPL-CCNC: 20 U/L (ref 15–37)
BASOPHILS # BLD: 0.07 K/UL
BASOPHILS NFR BLD: 1 % (ref 0–1)
BILIRUB DIRECT SERPL-MCNC: 0.3 MG/DL (ref 0–0.3)
BILIRUB INDIRECT SERPL-MCNC: 0.4 MG/DL (ref 0–0.7)
BILIRUB SERPL-MCNC: 0.6 MG/DL (ref 0–1)
BILIRUB UR QL STRIP: NEGATIVE
BUN SERPL-MCNC: 20 MG/DL (ref 7–20)
CALCIUM SERPL-MCNC: 9 MG/DL (ref 8.3–10.6)
CHLORIDE SERPL-SCNC: 98 MMOL/L (ref 99–110)
CLARITY UR: CLEAR
CO2 SERPL-SCNC: 23 MMOL/L (ref 21–32)
COLOR UR: YELLOW
CREAT SERPL-MCNC: 1.1 MG/DL (ref 0.9–1.3)
EOSINOPHIL # BLD: 0.06 K/UL
EOSINOPHILS RELATIVE PERCENT: 0 % (ref 0–3)
EPI CELLS #/AREA URNS HPF: <1 /HPF
ERYTHROCYTE [DISTWIDTH] IN BLOOD BY AUTOMATED COUNT: 12.6 % (ref 11.7–14.9)
GFR, ESTIMATED: 73 ML/MIN/1.73M2
GLUCOSE SERPL-MCNC: 109 MG/DL (ref 74–99)
GLUCOSE UR STRIP-MCNC: NEGATIVE MG/DL
HCT VFR BLD AUTO: 43.8 % (ref 42–52)
HGB BLD-MCNC: 15.5 G/DL (ref 13.5–18)
HGB UR QL STRIP.AUTO: ABNORMAL
IMM GRANULOCYTES # BLD AUTO: 0.04 K/UL
IMM GRANULOCYTES NFR BLD: 0 %
KETONES UR STRIP-MCNC: 40 MG/DL
LACTATE BLDV-SCNC: 0.9 MMOL/L (ref 0.4–2)
LACTATE BLDV-SCNC: 0.9 MMOL/L (ref 0.4–2)
LEUKOCYTE ESTERASE UR QL STRIP: NEGATIVE
LYMPHOCYTES NFR BLD: 4.36 K/UL
LYMPHOCYTES RELATIVE PERCENT: 32 % (ref 24–44)
MCH RBC QN AUTO: 30.5 PG (ref 27–31)
MCHC RBC AUTO-ENTMCNC: 35.4 G/DL (ref 32–36)
MCV RBC AUTO: 86.2 FL (ref 78–100)
MONOCYTES NFR BLD: 16 % (ref 0–4)
MONOCYTES NFR BLD: 2.13 K/UL
MUCOUS THREADS URNS QL MICRO: ABNORMAL
NEUTROPHILS NFR BLD: 51 % (ref 36–66)
NEUTS SEG NFR BLD: 6.8 K/UL
NITRITE UR QL STRIP: NEGATIVE
PH UR STRIP: 5.5 [PH] (ref 5–8)
PLATELET, FLUORESCENCE: 124 K/UL (ref 140–440)
PMV BLD AUTO: 10.9 FL (ref 7.5–11.1)
POTASSIUM SERPL-SCNC: 4.1 MMOL/L (ref 3.5–5.1)
PROT SERPL-MCNC: 6.9 G/DL (ref 6.4–8.2)
PROT UR STRIP-MCNC: 100 MG/DL
RBC # BLD AUTO: 5.08 M/UL (ref 4.6–6.2)
RBC #/AREA URNS HPF: 1 /HPF (ref 0–2)
SARS-COV-2 RDRP RESP QL NAA+PROBE: NOT DETECTED
SODIUM SERPL-SCNC: 134 MMOL/L (ref 136–145)
SP GR UR STRIP: 1.02 (ref 1–1.03)
SPECIMEN DESCRIPTION: NORMAL
TRICHOMONAS #/AREA URNS HPF: ABNORMAL /[HPF]
TROPONIN I SERPL HS-MCNC: <6 NG/L (ref 0–22)
TROPONIN I SERPL HS-MCNC: <6 NG/L (ref 0–22)
UROBILINOGEN UR STRIP-ACNC: 1 EU/DL (ref 0–1)
WBC #/AREA URNS HPF: <1 /HPF (ref 0–5)
WBC OTHER # BLD: 13.5 K/UL (ref 4–10.5)

## 2024-10-17 PROCEDURE — 93005 ELECTROCARDIOGRAM TRACING: CPT | Performed by: STUDENT IN AN ORGANIZED HEALTH CARE EDUCATION/TRAINING PROGRAM

## 2024-10-17 PROCEDURE — 84484 ASSAY OF TROPONIN QUANT: CPT

## 2024-10-17 PROCEDURE — 87635 SARS-COV-2 COVID-19 AMP PRB: CPT

## 2024-10-17 PROCEDURE — 6370000000 HC RX 637 (ALT 250 FOR IP): Performed by: INTERNAL MEDICINE

## 2024-10-17 PROCEDURE — 96374 THER/PROPH/DIAG INJ IV PUSH: CPT

## 2024-10-17 PROCEDURE — 6360000002 HC RX W HCPCS: Performed by: STUDENT IN AN ORGANIZED HEALTH CARE EDUCATION/TRAINING PROGRAM

## 2024-10-17 PROCEDURE — 71046 X-RAY EXAM CHEST 2 VIEWS: CPT

## 2024-10-17 PROCEDURE — 2580000003 HC RX 258: Performed by: INTERNAL MEDICINE

## 2024-10-17 PROCEDURE — 85025 COMPLETE CBC W/AUTO DIFF WBC: CPT

## 2024-10-17 PROCEDURE — 82248 BILIRUBIN DIRECT: CPT

## 2024-10-17 PROCEDURE — 83605 ASSAY OF LACTIC ACID: CPT

## 2024-10-17 PROCEDURE — 99285 EMERGENCY DEPT VISIT HI MDM: CPT

## 2024-10-17 PROCEDURE — 36415 COLL VENOUS BLD VENIPUNCTURE: CPT

## 2024-10-17 PROCEDURE — 6360000002 HC RX W HCPCS: Performed by: INTERNAL MEDICINE

## 2024-10-17 PROCEDURE — 2580000003 HC RX 258: Performed by: STUDENT IN AN ORGANIZED HEALTH CARE EDUCATION/TRAINING PROGRAM

## 2024-10-17 PROCEDURE — 81001 URINALYSIS AUTO W/SCOPE: CPT

## 2024-10-17 PROCEDURE — 80053 COMPREHEN METABOLIC PANEL: CPT

## 2024-10-17 PROCEDURE — 87040 BLOOD CULTURE FOR BACTERIA: CPT

## 2024-10-17 PROCEDURE — 1200000000 HC SEMI PRIVATE

## 2024-10-17 PROCEDURE — 96361 HYDRATE IV INFUSION ADD-ON: CPT

## 2024-10-17 RX ORDER — ONDANSETRON 2 MG/ML
4 INJECTION INTRAMUSCULAR; INTRAVENOUS EVERY 6 HOURS PRN
Status: DISCONTINUED | OUTPATIENT
Start: 2024-10-17 | End: 2024-10-20 | Stop reason: HOSPADM

## 2024-10-17 RX ORDER — SODIUM CHLORIDE 9 MG/ML
INJECTION, SOLUTION INTRAVENOUS PRN
Status: DISCONTINUED | OUTPATIENT
Start: 2024-10-17 | End: 2024-10-20 | Stop reason: HOSPADM

## 2024-10-17 RX ORDER — ENOXAPARIN SODIUM 100 MG/ML
30 INJECTION SUBCUTANEOUS 2 TIMES DAILY
Status: DISCONTINUED | OUTPATIENT
Start: 2024-10-17 | End: 2024-10-20 | Stop reason: HOSPADM

## 2024-10-17 RX ORDER — ACETAMINOPHEN 650 MG/1
650 SUPPOSITORY RECTAL EVERY 6 HOURS PRN
Status: DISCONTINUED | OUTPATIENT
Start: 2024-10-17 | End: 2024-10-20 | Stop reason: HOSPADM

## 2024-10-17 RX ORDER — IPRATROPIUM BROMIDE AND ALBUTEROL SULFATE 2.5; .5 MG/3ML; MG/3ML
1 SOLUTION RESPIRATORY (INHALATION) EVERY 4 HOURS PRN
Status: DISCONTINUED | OUTPATIENT
Start: 2024-10-17 | End: 2024-10-20 | Stop reason: HOSPADM

## 2024-10-17 RX ORDER — POLYETHYLENE GLYCOL 3350 17 G/17G
17 POWDER, FOR SOLUTION ORAL DAILY PRN
Status: DISCONTINUED | OUTPATIENT
Start: 2024-10-17 | End: 2024-10-20 | Stop reason: HOSPADM

## 2024-10-17 RX ORDER — BUPROPION HYDROCHLORIDE 150 MG/1
150 TABLET ORAL EVERY MORNING
COMMUNITY

## 2024-10-17 RX ORDER — ONDANSETRON 4 MG/1
4 TABLET, ORALLY DISINTEGRATING ORAL EVERY 8 HOURS PRN
Status: DISCONTINUED | OUTPATIENT
Start: 2024-10-17 | End: 2024-10-20 | Stop reason: HOSPADM

## 2024-10-17 RX ORDER — 0.9 % SODIUM CHLORIDE 0.9 %
1000 INTRAVENOUS SOLUTION INTRAVENOUS ONCE
Status: COMPLETED | OUTPATIENT
Start: 2024-10-17 | End: 2024-10-17

## 2024-10-17 RX ORDER — ACETAMINOPHEN 325 MG/1
650 TABLET ORAL EVERY 6 HOURS PRN
Status: DISCONTINUED | OUTPATIENT
Start: 2024-10-17 | End: 2024-10-20 | Stop reason: HOSPADM

## 2024-10-17 RX ORDER — SODIUM CHLORIDE 0.9 % (FLUSH) 0.9 %
5-40 SYRINGE (ML) INJECTION PRN
Status: DISCONTINUED | OUTPATIENT
Start: 2024-10-17 | End: 2024-10-20 | Stop reason: HOSPADM

## 2024-10-17 RX ORDER — SODIUM CHLORIDE 0.9 % (FLUSH) 0.9 %
5-40 SYRINGE (ML) INJECTION EVERY 12 HOURS SCHEDULED
Status: DISCONTINUED | OUTPATIENT
Start: 2024-10-17 | End: 2024-10-20 | Stop reason: HOSPADM

## 2024-10-17 RX ORDER — SODIUM CHLORIDE 9 MG/ML
INJECTION, SOLUTION INTRAVENOUS CONTINUOUS
Status: DISPENSED | OUTPATIENT
Start: 2024-10-17 | End: 2024-10-18

## 2024-10-17 RX ADMIN — ENOXAPARIN SODIUM 30 MG: 100 INJECTION SUBCUTANEOUS at 21:43

## 2024-10-17 RX ADMIN — SODIUM CHLORIDE, PRESERVATIVE FREE 10 ML: 5 INJECTION INTRAVENOUS at 21:42

## 2024-10-17 RX ADMIN — SODIUM CHLORIDE: 9 INJECTION, SOLUTION INTRAVENOUS at 21:46

## 2024-10-17 RX ADMIN — ACETAMINOPHEN 650 MG: 325 TABLET ORAL at 21:42

## 2024-10-17 RX ADMIN — SODIUM CHLORIDE 500 MG: 9 INJECTION, SOLUTION INTRAVENOUS at 18:33

## 2024-10-17 RX ADMIN — SODIUM CHLORIDE 1000 ML: 9 INJECTION, SOLUTION INTRAVENOUS at 18:57

## 2024-10-17 RX ADMIN — CEFTRIAXONE 1000 MG: 1 INJECTION, POWDER, FOR SOLUTION INTRAMUSCULAR; INTRAVENOUS at 18:25

## 2024-10-17 ASSESSMENT — PAIN SCALES - GENERAL
PAINLEVEL_OUTOF10: 3
PAINLEVEL_OUTOF10: 3

## 2024-10-17 ASSESSMENT — PAIN DESCRIPTION - PAIN TYPE: TYPE: ACUTE PAIN

## 2024-10-17 ASSESSMENT — PAIN DESCRIPTION - LOCATION
LOCATION: BACK
LOCATION: HEAD

## 2024-10-17 ASSESSMENT — PAIN DESCRIPTION - ORIENTATION
ORIENTATION: POSTERIOR
ORIENTATION: RIGHT;LEFT

## 2024-10-17 ASSESSMENT — PAIN DESCRIPTION - DESCRIPTORS
DESCRIPTORS: ACHING
DESCRIPTORS: ACHING

## 2024-10-17 ASSESSMENT — PAIN DESCRIPTION - ONSET: ONSET: ON-GOING

## 2024-10-17 ASSESSMENT — PAIN DESCRIPTION - FREQUENCY: FREQUENCY: INTERMITTENT

## 2024-10-17 ASSESSMENT — PAIN - FUNCTIONAL ASSESSMENT
PAIN_FUNCTIONAL_ASSESSMENT: ACTIVITIES ARE NOT PREVENTED
PAIN_FUNCTIONAL_ASSESSMENT: 0-10

## 2024-10-17 NOTE — ED PROVIDER NOTES
Emergency Department Encounter    Patient: Jose Gomes  MRN: [id number]  : 1980  Date of Evaluation: 10/17/2024  ED Provider:  Christoph Avalos DO    Triage Chief Complaint:   Fever (X3 days ), Generalized Body Aches, and Shortness of Breath    [place]:  Jose Gomes is a 44 y.o. male that presents with shortness of breath body aches and fevers.  Reports fever of 103 at the highest at home.  He has had a dry cough as well.  States that shortness of breath is getting progressively worse.  No chest pain.  No history of DVT or PE.  No swelling or edema in his legs.  No recent surgeries broken bones or long travel.  No known heart failure or cardiac issues.  He states his kids were sick with URI symptoms a few weeks ago.  No other known sick contacts    MDM:    History from : Patient    On arrival patient was tachycardic and had a temperature of 100.1.  He was actually hypertensive.  He is ill-appearing but not toxic.  No significant respiratory distress.  Given his complaints we will do chest x-ray and also proceed sepsis workup.  Viral testing for COVID-19 negative.  EKG without any concerns for arrhythmia or ischemia.  Troponin undetectable.  Does have right upper lobe pneumonia on chest x-ray.  Given his tachycardia leukocytosis and pneumonia my concern was for sepsis.  Initially given nationwide shortage of IV fluids we did give him 1 L of oral fluids and he tolerated this well.  He was still persistently tachycardic so we did give him another liter of IV fluids.  Ceftriaxone azithromycin to cover community-acquired pneumonia.  Given his persistent tachycardia and possible concern for sepsis we will have him admitted for further management.    ED Course as of 10/17/24 1840   Thu Oct 17, 2024   1753 EKG interpreted without cardiology.  Sinus rhythm with a rate of 121.  No ST elevation or ST depression.  No abnormal T wave inversion.   QRS 80 QTc 423 [LA]      ED Course User Index  [LA] Christoph Avalos

## 2024-10-17 NOTE — ED NOTES
ED TO INPATIENT SBAR HANDOFF    Patient Name: Jose Gomes   :  1980  44 y.o.   Preferred Name  Jose Gomes  Family/Caregiver Present no   Restraints no   C-SSRS: Risk of Suicide: No Risk  Sitter no   Sepsis Risk Score        Situation  Chief Complaint   Patient presents with    Fever     X3 days     Generalized Body Aches    Shortness of Breath     Brief Description of Patient's Condition: Jose Gomes is a 44 y.o. male that presents with shortness of breath body aches and fevers.  Reports fever of 103 at the highest at home.  He has had a dry cough as well.  States that shortness of breath is getting progressively worse.  No chest pain.  No history of DVT or PE.  No swelling or edema in his legs.  No recent surgeries broken bones or long travel.  No known heart failure or cardiac issues.  He states his kids were sick with URI symptoms a few weeks ago.  No other known sick contacts   Mental Status: oriented, alert, and thought processes intact  Arrived from: home    Imaging:   XR CHEST (2 VW)   Final Result   Consolidation in the right upper lobe likely related to pneumonia. However,   continued follow-up is recommended to ensure resolution and to exclude   underlying malignancy.         Electronically signed by Addison Dwyer        Abnormal labs:   Abnormal Labs Reviewed   BASIC METABOLIC PANEL - Abnormal; Notable for the following components:       Result Value    Sodium 134 (*)     Chloride 98 (*)     Glucose 109 (*)     All other components within normal limits   CBC WITH AUTO DIFFERENTIAL - Abnormal; Notable for the following components:    WBC 13.5 (*)     Platelet, Fluorescence 124 (*)     Monocytes % 16 (*)     All other components within normal limits   URINALYSIS - Abnormal; Notable for the following components:    Ketones, Urine 40 (*)     Urine Hgb SMALL (*)     Protein,  (*)     All other components within normal limits        Background  History:   Past Medical History:   Diagnosis

## 2024-10-17 NOTE — H&P
Shortness of breath. COMPARISON:  None TECHNIQUE: PA and lateral views of the chest were obtained. FINDINGS: Cardiac size and pulmonary vasculature are within normal limits. There is a consolidation in the right upper lobe. No sizable pleural effusion or pneumothorax is present.     Consolidation in the right upper lobe likely related to pneumonia. However, continued follow-up is recommended to ensure resolution and to exclude underlying malignancy. Electronically signed by Addison Dwyer      Personally reviewed Lab Studies, Imaging, and discussed case with ED physician.    Electronically signed by Uli Lovelace MD on 10/17/2024 at 7:06 PM    Comment: Please note this report has been produced using speech recognition software and may contain errors related to that system including errors in grammar, punctuation, and spelling, as well as words and phrases that may be inappropriate. If there are any questions or concerns please feel free to contact the dictating provider for clarification.

## 2024-10-18 LAB
ABSOLUTE BANDS: 0.92 K/UL
ANION GAP SERPL CALCULATED.3IONS-SCNC: 12 MMOL/L (ref 9–17)
B PARAP IS1001 DNA NPH QL NAA+NON-PROBE: NOT DETECTED
B PERT DNA SPEC QL NAA+PROBE: NOT DETECTED
BANDS: 7 %
BASOPHILS # BLD: 0 K/UL
BASOPHILS NFR BLD: 0 % (ref 0–1)
BUN SERPL-MCNC: 16 MG/DL (ref 7–20)
C PNEUM DNA NPH QL NAA+NON-PROBE: NOT DETECTED
CALCIUM SERPL-MCNC: 8.2 MG/DL (ref 8.3–10.6)
CHLORIDE SERPL-SCNC: 97 MMOL/L (ref 99–110)
CO2 SERPL-SCNC: 24 MMOL/L (ref 21–32)
CREAT SERPL-MCNC: 1.1 MG/DL (ref 0.9–1.3)
EKG ATRIAL RATE: 121 BPM
EKG DIAGNOSIS: NORMAL
EKG P AXIS: 45 DEGREES
EKG P-R INTERVAL: 140 MS
EKG Q-T INTERVAL: 298 MS
EKG QRS DURATION: 80 MS
EKG QTC CALCULATION (BAZETT): 423 MS
EKG R AXIS: 66 DEGREES
EKG T AXIS: 45 DEGREES
EKG VENTRICULAR RATE: 121 BPM
EOSINOPHIL # BLD: 0 K/UL
EOSINOPHILS RELATIVE PERCENT: 0 % (ref 0–3)
ERYTHROCYTE [DISTWIDTH] IN BLOOD BY AUTOMATED COUNT: 12.8 % (ref 11.7–14.9)
FLUAV RNA NPH QL NAA+NON-PROBE: NOT DETECTED
FLUBV RNA NPH QL NAA+NON-PROBE: NOT DETECTED
GFR, ESTIMATED: 74 ML/MIN/1.73M2
GLUCOSE SERPL-MCNC: 106 MG/DL (ref 74–99)
HADV DNA NPH QL NAA+NON-PROBE: NOT DETECTED
HCOV 229E RNA NPH QL NAA+NON-PROBE: NOT DETECTED
HCOV HKU1 RNA NPH QL NAA+NON-PROBE: NOT DETECTED
HCOV NL63 RNA NPH QL NAA+NON-PROBE: NOT DETECTED
HCOV OC43 RNA NPH QL NAA+NON-PROBE: NOT DETECTED
HCT VFR BLD AUTO: 41.5 % (ref 42–52)
HGB BLD-MCNC: 14.2 G/DL (ref 13.5–18)
HMPV RNA NPH QL NAA+NON-PROBE: NOT DETECTED
HPIV1 RNA NPH QL NAA+NON-PROBE: NOT DETECTED
HPIV2 RNA NPH QL NAA+NON-PROBE: NOT DETECTED
HPIV3 RNA NPH QL NAA+NON-PROBE: NOT DETECTED
HPIV4 RNA NPH QL NAA+NON-PROBE: NOT DETECTED
L PNEUMO1 AG UR QL IA.RAPID: NEGATIVE
LYMPHOCYTES NFR BLD: 3.8 K/UL
LYMPHOCYTES RELATIVE PERCENT: 29 % (ref 24–44)
M PNEUMO DNA NPH QL NAA+NON-PROBE: NOT DETECTED
MCH RBC QN AUTO: 29.9 PG (ref 27–31)
MCHC RBC AUTO-ENTMCNC: 34.2 G/DL (ref 32–36)
MCV RBC AUTO: 87.4 FL (ref 78–100)
MONOCYTES NFR BLD: 1.97 K/UL
MONOCYTES NFR BLD: 15 % (ref 0–4)
NEUTROPHILS NFR BLD: 49 % (ref 36–66)
NEUTS SEG NFR BLD: 6.42 K/UL
PLATELET ESTIMATE: ABNORMAL
PLATELET, FLUORESCENCE: 114 K/UL (ref 140–440)
PMV BLD AUTO: 10.7 FL (ref 7.5–11.1)
POTASSIUM SERPL-SCNC: 3.9 MMOL/L (ref 3.5–5.1)
PROCALCITONIN SERPL-MCNC: 0.41 NG/ML
RBC # BLD AUTO: 4.75 M/UL (ref 4.6–6.2)
RBC # BLD: NORMAL 10*6/UL
RSV RNA NPH QL NAA+NON-PROBE: NOT DETECTED
RV+EV RNA NPH QL NAA+NON-PROBE: NOT DETECTED
S PNEUM AG SPEC QL: NEGATIVE
SARS-COV-2 RNA NPH QL NAA+NON-PROBE: NOT DETECTED
SODIUM SERPL-SCNC: 133 MMOL/L (ref 136–145)
SPECIMEN DESCRIPTION: NORMAL
SPECIMEN SOURCE: NORMAL
WBC # BLD: NORMAL 10*3/UL
WBC OTHER # BLD: 13.1 K/UL (ref 4–10.5)

## 2024-10-18 PROCEDURE — 6370000000 HC RX 637 (ALT 250 FOR IP): Performed by: INTERNAL MEDICINE

## 2024-10-18 PROCEDURE — 36415 COLL VENOUS BLD VENIPUNCTURE: CPT

## 2024-10-18 PROCEDURE — 2580000003 HC RX 258: Performed by: INTERNAL MEDICINE

## 2024-10-18 PROCEDURE — 6360000002 HC RX W HCPCS: Performed by: INTERNAL MEDICINE

## 2024-10-18 PROCEDURE — 87070 CULTURE OTHR SPECIMN AEROBIC: CPT

## 2024-10-18 PROCEDURE — 1200000000 HC SEMI PRIVATE

## 2024-10-18 PROCEDURE — 80048 BASIC METABOLIC PNL TOTAL CA: CPT

## 2024-10-18 PROCEDURE — 2580000003 HC RX 258

## 2024-10-18 PROCEDURE — 87449 NOS EACH ORGANISM AG IA: CPT

## 2024-10-18 PROCEDURE — 93010 ELECTROCARDIOGRAM REPORT: CPT | Performed by: INTERNAL MEDICINE

## 2024-10-18 PROCEDURE — 84145 PROCALCITONIN (PCT): CPT

## 2024-10-18 PROCEDURE — 87899 AGENT NOS ASSAY W/OPTIC: CPT

## 2024-10-18 PROCEDURE — 87205 SMEAR GRAM STAIN: CPT

## 2024-10-18 PROCEDURE — 85025 COMPLETE CBC W/AUTO DIFF WBC: CPT

## 2024-10-18 PROCEDURE — 0202U NFCT DS 22 TRGT SARS-COV-2: CPT

## 2024-10-18 PROCEDURE — 94761 N-INVAS EAR/PLS OXIMETRY MLT: CPT

## 2024-10-18 PROCEDURE — 6370000000 HC RX 637 (ALT 250 FOR IP): Performed by: STUDENT IN AN ORGANIZED HEALTH CARE EDUCATION/TRAINING PROGRAM

## 2024-10-18 PROCEDURE — 6360000002 HC RX W HCPCS

## 2024-10-18 RX ORDER — BUPROPION HYDROCHLORIDE 150 MG/1
150 TABLET ORAL EVERY MORNING
Status: DISCONTINUED | OUTPATIENT
Start: 2024-10-19 | End: 2024-10-20 | Stop reason: HOSPADM

## 2024-10-18 RX ADMIN — SODIUM CHLORIDE, PRESERVATIVE FREE 10 ML: 5 INJECTION INTRAVENOUS at 09:38

## 2024-10-18 RX ADMIN — SODIUM CHLORIDE: 9 INJECTION, SOLUTION INTRAVENOUS at 05:47

## 2024-10-18 RX ADMIN — SODIUM CHLORIDE, PRESERVATIVE FREE 10 ML: 5 INJECTION INTRAVENOUS at 20:49

## 2024-10-18 RX ADMIN — SODIUM CHLORIDE 500 MG: 9 INJECTION, SOLUTION INTRAVENOUS at 17:34

## 2024-10-18 RX ADMIN — ACETAMINOPHEN 650 MG: 325 TABLET ORAL at 17:42

## 2024-10-18 RX ADMIN — ACETAMINOPHEN 650 MG: 325 TABLET ORAL at 05:44

## 2024-10-18 RX ADMIN — WATER 1000 MG: 1 INJECTION INTRAMUSCULAR; INTRAVENOUS; SUBCUTANEOUS at 16:13

## 2024-10-18 RX ADMIN — ACETAMINOPHEN, ASPIRIN, CAFFEINE 1 TABLET: 250; 65; 250 TABLET, FILM COATED ORAL at 17:23

## 2024-10-18 RX ADMIN — SODIUM CHLORIDE: 9 INJECTION, SOLUTION INTRAVENOUS at 17:32

## 2024-10-18 RX ADMIN — ACETAMINOPHEN 650 MG: 325 TABLET ORAL at 11:53

## 2024-10-18 RX ADMIN — ENOXAPARIN SODIUM 30 MG: 100 INJECTION SUBCUTANEOUS at 20:48

## 2024-10-18 ASSESSMENT — PAIN DESCRIPTION - DESCRIPTORS
DESCRIPTORS: ACHING
DESCRIPTORS: ACHING

## 2024-10-18 ASSESSMENT — PAIN SCALES - GENERAL
PAINLEVEL_OUTOF10: 3
PAINLEVEL_OUTOF10: 5
PAINLEVEL_OUTOF10: 4

## 2024-10-18 ASSESSMENT — PAIN DESCRIPTION - LOCATION
LOCATION: HEAD
LOCATION: HEAD

## 2024-10-18 ASSESSMENT — PAIN - FUNCTIONAL ASSESSMENT
PAIN_FUNCTIONAL_ASSESSMENT: ACTIVITIES ARE NOT PREVENTED
PAIN_FUNCTIONAL_ASSESSMENT: ACTIVITIES ARE NOT PREVENTED

## 2024-10-18 NOTE — PLAN OF CARE
Problem: Discharge Planning  Goal: Discharge to home or other facility with appropriate resources  Outcome: Progressing     Problem: Pain  Goal: Verbalizes/displays adequate comfort level or baseline comfort level  Outcome: Progressing     Problem: Skin/Tissue Integrity - Adult  Goal: Skin integrity remains intact  Outcome: Progressing     Problem: Infection - Adult  Goal: Absence of infection at discharge  Outcome: Progressing     Problem: Safety - Adult  Goal: Free from fall injury  Outcome: Progressing

## 2024-10-18 NOTE — CARE COORDINATION
Pt lives at home with spouse.  Pt has insurance to help with his healthcare cost.  Pt does not have a PCP.  LSW placed a PCP list on pt's AVS.  Pt is independent of his ADLS.  No needs identified at this time.  CM available if needed.

## 2024-10-18 NOTE — PROGRESS NOTES
4 Eyes Skin Assessment     NAME:  Jose Gomes  YOB: 1980  MEDICAL RECORD NUMBER:  [id number]    The patient is being assessed for  Admission    I agree that at least one RN has performed a thorough Head to Toe Skin Assessment on the patient. ALL assessment sites listed below have been assessed.      Areas assessed by both nurses:    Head, Face, Ears, Shoulders, Back, Chest, Arms, Elbows, Hands, Sacrum. Buttock, Coccyx, Ischium, and Legs. Feet and Heels        Does the Patient have a Wound? No noted wound(s)       Santo Prevention initiated by RN: Yes  Wound Care Orders initiated by RN: No    Pressure Injury (Stage 3,4, Unstageable, DTI, NWPT, and Complex wounds) if present, place Wound referral order by RN under : No    New Ostomies, if present place, Ostomy referral order under : No     Nurse 1 eSignature: Electronically signed by James Payne RN on 10/18/24 at 4:06 AM EDT    **SHARE this note so that the co-signing nurse can place an eSignature**    Nurse 2 eSignature: Electronically signed by Zainab Mcmahon RN on 10/18/24 at 4:10 AM EDT

## 2024-10-18 NOTE — DISCHARGE INSTRUCTIONS
1. Call to schedule follow up hospital follow up appointment:   [facility]  [address]  [address]  [place], [place] [address]  Tel: [phone]    [facility]  [address]  [place], [place] [address]  [phone]     [facility]   [address] [place], [place]   [phone]     2. Establish care with a primary care provider  Physician Finder [phone]     [facility]  [address], [place] [address]  [phone]  FERNANDO Cabral,CNP    [facility]  [address] [place] [address]  [phone]  MD Mahnaz Law MD Deanna N. Smith, APRN, CNP  Mikayla Vizcarra, FERNANDO Dash, CNP     [facility]  [address], [address], [place] [address]  [phone]  Yareli Olguin, DO Josep Collins, PAMD Steve Jerry, PARADAMES    [facility]  [address] [place] [address]  [phone]  Li Farfan MD    [facility]  [address] [place] [address] [phone]  MD Toni Casiano MD    [facility]  [address] [place] [address]  [phone]  FERNANDO Vaughn, CNP  FERNANDO Douglass, CNP   MD Yoli Marion, PAFreemanC   Deanna Dobbs MD    [facility] (*Active Waiting List*)   [address] [place], [place]  [phone]    Dr. Cheryl Paniagua MD  [address] [place] [address]  [phone]    [facility]  [address], [place] [address] [phone]    [facility]  [address]  , [place] [address] [phone]  DO Adria Palafox

## 2024-10-18 NOTE — PLAN OF CARE
Problem: Discharge Planning  Goal: Discharge to home or other facility with appropriate resources  10/18/2024 1052 by Maco Reyes RN  Outcome: Progressing  10/17/2024 2256 by James Payne RN  Outcome: Progressing     Problem: Pain  Goal: Verbalizes/displays adequate comfort level or baseline comfort level  10/18/2024 1052 by Maco Reyes RN  Outcome: Progressing  10/17/2024 2256 by James Payne RN  Outcome: Progressing     Problem: Skin/Tissue Integrity - Adult  Goal: Skin integrity remains intact  10/18/2024 1052 by Maco Reyes RN  Outcome: Progressing  10/17/2024 2256 by James Payne RN  Outcome: Progressing     Problem: Infection - Adult  Goal: Absence of infection at discharge  10/18/2024 1052 by Maco Reyes RN  Outcome: Progressing  10/17/2024 2256 by James Payne RN  Outcome: Progressing     Problem: Safety - Adult  Goal: Free from fall injury  10/18/2024 1052 by Maco Reyes RN  Outcome: Progressing  10/17/2024 2256 by James Payne RN  Outcome: Progressing

## 2024-10-18 NOTE — PROGRESS NOTES
V2.0    [facility] [place]      Name:  Jose Gomes /Age/Sex: 1980  (44 y.o. male)   MRN & CSN:  [id number] & [id number] Encounter Date/Time: 10/18/2024 11:18 AM EDT   Location:  [address] [place] [address]-A PCP: No primary care provider on file.     Attending:Hernan Dwyer MD       Hospital Day: 2    Assessment and Recommendations   Jose Gomes is a 44 y.o. male with pmh of CLL, chronic tobacco dependence who presents with Sepsis ([facility])      Plan:     #.  Sepsis  #.  CAP  -Tmax-102.4, tachycardia, WBC 13.5  -Lactic acid 0.9  -Check procalcitonin  -Chest x-ray-consolidation in the right upper lobe likely related to pneumonia.  -UA not suggestive of infection  -Blood cultures drawn in ED  -Patient received ceftriaxone, azithromycin-continue  -Received 1 L NS bolus in ER  -Check urine strep pneumoniae, Legionella antigen  -Sputum culture ordered     #.  Depression  -Patient is on Wellbutrin-did not know the dose.  -Ordered 150 mg daily     #.  History of CLL  -Under observation  -Following with hematology/oncology     #.  Thrombocytopenia  -Platelets 124  -Monitor with repeat CBC.     #.  Chronic tobacco dependence-admits to smoking 1 PPD     #.  Obesity with BMI 30.34    Diet ADULT DIET; Regular   DVT Prophylaxis [x] Lovenox, []  Heparin, [] SCDs, [] Ambulation,  [] Eliquis, [] Xarelto  [] Coumadin   Code Status Full Code   Disposition From: Home  Expected Disposition:   Estimated Date of Discharge:   Patient requires continued admission due to    Surrogate Decision Maker/ POA  Self     Personally reviewed Lab Studies and Imaging       Subjective:     Chief Complaint: Sepsis    Jose Gomes is a 44 y.o. male who presents with sepsis iso peumonia      Review of Systems:      Pertinent positives and negatives discussed in HPI    Objective:     Intake/Output Summary (Last 24 hours) at 10/18/2024 1118  Last data filed at 10/18/2024 0549  Gross per 24 hour   Intake 350 ml   Output 550 ml   Net -200 ml      Vitals:

## 2024-10-19 LAB
ALBUMIN SERPL-MCNC: 3.4 G/DL (ref 3.4–5)
ALBUMIN/GLOB SERPL: 1.3 {RATIO} (ref 1.1–2.2)
ALP SERPL-CCNC: 73 U/L (ref 40–129)
ALT SERPL-CCNC: 42 U/L (ref 10–40)
ANION GAP SERPL CALCULATED.3IONS-SCNC: 10 MMOL/L (ref 9–17)
AST SERPL-CCNC: 52 U/L (ref 15–37)
BILIRUB SERPL-MCNC: 0.3 MG/DL (ref 0–1)
BUN SERPL-MCNC: 18 MG/DL (ref 7–20)
CALCIUM SERPL-MCNC: 8.6 MG/DL (ref 8.3–10.6)
CHLORIDE SERPL-SCNC: 101 MMOL/L (ref 99–110)
CO2 SERPL-SCNC: 26 MMOL/L (ref 21–32)
CREAT SERPL-MCNC: 1 MG/DL (ref 0.9–1.3)
ERYTHROCYTE [DISTWIDTH] IN BLOOD BY AUTOMATED COUNT: 12.8 % (ref 11.7–14.9)
GFR, ESTIMATED: 81 ML/MIN/1.73M2
GLUCOSE SERPL-MCNC: 118 MG/DL (ref 74–99)
HCT VFR BLD AUTO: 39.2 % (ref 42–52)
HGB BLD-MCNC: 13.6 G/DL (ref 13.5–18)
MAGNESIUM SERPL-MCNC: 2.3 MG/DL (ref 1.8–2.4)
MCH RBC QN AUTO: 30.3 PG (ref 27–31)
MCHC RBC AUTO-ENTMCNC: 34.7 G/DL (ref 32–36)
MCV RBC AUTO: 87.3 FL (ref 78–100)
PHOSPHATE SERPL-MCNC: 2.9 MG/DL (ref 2.5–4.9)
PLATELET, FLUORESCENCE: 129 K/UL (ref 140–440)
PMV BLD AUTO: 11.3 FL (ref 7.5–11.1)
POTASSIUM SERPL-SCNC: 4.1 MMOL/L (ref 3.5–5.1)
PROT SERPL-MCNC: 6.2 G/DL (ref 6.4–8.2)
RBC # BLD AUTO: 4.49 M/UL (ref 4.6–6.2)
SODIUM SERPL-SCNC: 137 MMOL/L (ref 136–145)
WBC OTHER # BLD: 10 K/UL (ref 4–10.5)

## 2024-10-19 PROCEDURE — 36415 COLL VENOUS BLD VENIPUNCTURE: CPT

## 2024-10-19 PROCEDURE — 6360000002 HC RX W HCPCS

## 2024-10-19 PROCEDURE — 94761 N-INVAS EAR/PLS OXIMETRY MLT: CPT

## 2024-10-19 PROCEDURE — 2580000003 HC RX 258: Performed by: INTERNAL MEDICINE

## 2024-10-19 PROCEDURE — 2580000003 HC RX 258

## 2024-10-19 PROCEDURE — 80053 COMPREHEN METABOLIC PANEL: CPT

## 2024-10-19 PROCEDURE — 84100 ASSAY OF PHOSPHORUS: CPT

## 2024-10-19 PROCEDURE — 1200000000 HC SEMI PRIVATE

## 2024-10-19 PROCEDURE — 83735 ASSAY OF MAGNESIUM: CPT

## 2024-10-19 PROCEDURE — 6370000000 HC RX 637 (ALT 250 FOR IP): Performed by: STUDENT IN AN ORGANIZED HEALTH CARE EDUCATION/TRAINING PROGRAM

## 2024-10-19 PROCEDURE — 85027 COMPLETE CBC AUTOMATED: CPT

## 2024-10-19 RX ADMIN — SODIUM CHLORIDE, PRESERVATIVE FREE 10 ML: 5 INJECTION INTRAVENOUS at 20:04

## 2024-10-19 RX ADMIN — BUPROPION HYDROCHLORIDE 150 MG: 150 TABLET, EXTENDED RELEASE ORAL at 08:55

## 2024-10-19 RX ADMIN — SODIUM CHLORIDE, PRESERVATIVE FREE 10 ML: 5 INJECTION INTRAVENOUS at 08:55

## 2024-10-19 RX ADMIN — SODIUM CHLORIDE 500 MG: 9 INJECTION, SOLUTION INTRAVENOUS at 18:00

## 2024-10-19 RX ADMIN — WATER 1000 MG: 1 INJECTION INTRAMUSCULAR; INTRAVENOUS; SUBCUTANEOUS at 16:22

## 2024-10-19 NOTE — PROGRESS NOTES
V2.0    [facility] [place]      Name:  Jose Gomes /Age/Sex: 1980  (44 y.o. male)   MRN & CSN:  [id number] & [id number] Encounter Date/Time: 10/19/2024 11:18 AM EDT   Location:  [address] [place] [address]-A PCP: No primary care provider on file.     Attending:Hernan Dwyer MD       Hospital Day: 3    Assessment and Recommendations   Jose Gomes is a 44 y.o. male with pmh of CLL, chronic tobacco dependence who presents with Sepsis ([facility])      Plan:     #.  Sepsis  #.  CAP  -Tmax-102.4, tachycardia, WBC 13.5  -Lactic acid 0.9  -Check procalcitonin  -Chest x-ray-consolidation in the right upper lobe likely related to pneumonia.  -UA not suggestive of infection  -Blood cultures drawn in ED  -Patient received ceftriaxone, azithromycin-continue  -Received 1 L NS bolus in ER  -Check urine strep pneumoniae, Legionella antigen  -Sputum culture ordered     #.  Depression  -Patient is on Wellbutrin-did not know the dose.  -Ordered 150 mg daily     #.  History of CLL  -Under observation  -Following with hematology/oncology     #.  Thrombocytopenia  -Platelets 124  -Monitor with repeat CBC.     #.  Chronic tobacco dependence-admits to smoking 1 PPD     #.  Obesity with BMI 30.34    Diet ADULT DIET; Regular   DVT Prophylaxis [x] Lovenox, []  Heparin, [] SCDs, [] Ambulation,  [] Eliquis, [] Xarelto  [] Coumadin   Code Status Full Code   Disposition From: Home  Expected Disposition:   Estimated Date of Discharge:   Patient requires continued admission due to    Surrogate Decision Maker/ POA  Self     Personally reviewed Lab Studies and Imaging       Subjective:     Chief Complaint: Sepsis    Jose Gomes is a 44 y.o. male who presents with sepsis iso peumonia      Review of Systems:      Pertinent positives and negatives discussed in HPI    Objective:   No intake or output data in the 24 hours ending 10/19/24 0734     Vitals:   Vitals:    10/18/24 1436 10/18/24 1723 10/18/24 2045 10/19/24 0224   BP: 115/72  112/67 122/67   Pulse:

## 2024-10-19 NOTE — PLAN OF CARE
Problem: Discharge Planning  Goal: Discharge to home or other facility with appropriate resources  10/18/2024 2224 by James Payne RN  Outcome: Progressing  10/18/2024 1052 by Maco Reyes, RN  Outcome: Progressing     Problem: Pain  Goal: Verbalizes/displays adequate comfort level or baseline comfort level  10/18/2024 2224 by James Payne RN  Outcome: Progressing  10/18/2024 1052 by Maco Reyes, RN  Outcome: Progressing     Problem: Skin/Tissue Integrity - Adult  Goal: Skin integrity remains intact  10/18/2024 2224 by James Payne RN  Outcome: Progressing  10/18/2024 1052 by Maco Reyes RN  Outcome: Progressing     Problem: Infection - Adult  Goal: Absence of infection at discharge  10/18/2024 2224 by James Payne RN  Outcome: Progressing  10/18/2024 1052 by Maco Reyes, RN  Outcome: Progressing     Problem: Safety - Adult  Goal: Free from fall injury  10/18/2024 2224 by James Payne RN  Outcome: Progressing  10/18/2024 1052 by Maco Reyes, RN  Outcome: Progressing

## 2024-10-20 VITALS
HEART RATE: 76 BPM | HEIGHT: 73 IN | TEMPERATURE: 97.7 F | SYSTOLIC BLOOD PRESSURE: 127 MMHG | DIASTOLIC BLOOD PRESSURE: 88 MMHG | RESPIRATION RATE: 18 BRPM | OXYGEN SATURATION: 97 % | BODY MASS INDEX: 30.48 KG/M2 | WEIGHT: 230 LBS

## 2024-10-20 LAB
ALBUMIN SERPL-MCNC: 3.2 G/DL (ref 3.4–5)
ALBUMIN/GLOB SERPL: 1.2 {RATIO} (ref 1.1–2.2)
ALP SERPL-CCNC: 65 U/L (ref 40–129)
ALT SERPL-CCNC: 38 U/L (ref 10–40)
ANION GAP SERPL CALCULATED.3IONS-SCNC: 8 MMOL/L (ref 9–17)
AST SERPL-CCNC: 35 U/L (ref 15–37)
BILIRUB SERPL-MCNC: <0.2 MG/DL (ref 0–1)
BUN SERPL-MCNC: 15 MG/DL (ref 7–20)
CALCIUM SERPL-MCNC: 8.5 MG/DL (ref 8.3–10.6)
CHLORIDE SERPL-SCNC: 103 MMOL/L (ref 99–110)
CO2 SERPL-SCNC: 25 MMOL/L (ref 21–32)
CREAT SERPL-MCNC: 0.9 MG/DL (ref 0.9–1.3)
ERYTHROCYTE [DISTWIDTH] IN BLOOD BY AUTOMATED COUNT: 12.9 % (ref 11.7–14.9)
GFR, ESTIMATED: >90 ML/MIN/1.73M2
GLUCOSE SERPL-MCNC: 115 MG/DL (ref 74–99)
HCT VFR BLD AUTO: 38.6 % (ref 42–52)
HGB BLD-MCNC: 13.2 G/DL (ref 13.5–18)
MAGNESIUM SERPL-MCNC: 2.1 MG/DL (ref 1.8–2.4)
MCH RBC QN AUTO: 30.1 PG (ref 27–31)
MCHC RBC AUTO-ENTMCNC: 34.2 G/DL (ref 32–36)
MCV RBC AUTO: 88.1 FL (ref 78–100)
MICROORGANISM SPEC CULT: ABNORMAL
MICROORGANISM/AGENT SPEC: ABNORMAL
PHOSPHATE SERPL-MCNC: 2.7 MG/DL (ref 2.5–4.9)
PLATELET, FLUORESCENCE: 140 K/UL (ref 140–440)
PMV BLD AUTO: 11 FL (ref 7.5–11.1)
POTASSIUM SERPL-SCNC: 3.9 MMOL/L (ref 3.5–5.1)
PROT SERPL-MCNC: 5.8 G/DL (ref 6.4–8.2)
RBC # BLD AUTO: 4.38 M/UL (ref 4.6–6.2)
SODIUM SERPL-SCNC: 137 MMOL/L (ref 136–145)
SPECIMEN DESCRIPTION: ABNORMAL
WBC OTHER # BLD: 9.6 K/UL (ref 4–10.5)

## 2024-10-20 PROCEDURE — 84100 ASSAY OF PHOSPHORUS: CPT

## 2024-10-20 PROCEDURE — 83735 ASSAY OF MAGNESIUM: CPT

## 2024-10-20 PROCEDURE — 6370000000 HC RX 637 (ALT 250 FOR IP): Performed by: STUDENT IN AN ORGANIZED HEALTH CARE EDUCATION/TRAINING PROGRAM

## 2024-10-20 PROCEDURE — 2580000003 HC RX 258: Performed by: INTERNAL MEDICINE

## 2024-10-20 PROCEDURE — 85027 COMPLETE CBC AUTOMATED: CPT

## 2024-10-20 PROCEDURE — 80053 COMPREHEN METABOLIC PANEL: CPT

## 2024-10-20 PROCEDURE — 36415 COLL VENOUS BLD VENIPUNCTURE: CPT

## 2024-10-20 RX ORDER — LACTOBACILLUS RHAMNOSUS GG 10B CELL
1 CAPSULE ORAL DAILY
Qty: 30 CAPSULE | Refills: 0 | Status: SHIPPED | OUTPATIENT
Start: 2024-10-20 | End: 2024-10-20

## 2024-10-20 RX ORDER — LEVOFLOXACIN 750 MG/1
750 TABLET, FILM COATED ORAL DAILY
Qty: 5 TABLET | Refills: 0 | Status: SHIPPED | OUTPATIENT
Start: 2024-10-20 | End: 2024-10-27

## 2024-10-20 RX ORDER — LACTOBACILLUS RHAMNOSUS GG 10B CELL
1 CAPSULE ORAL DAILY
Qty: 30 CAPSULE | Refills: 0 | Status: SHIPPED | OUTPATIENT
Start: 2024-10-20 | End: 2024-11-19

## 2024-10-20 RX ORDER — LEVOFLOXACIN 750 MG/1
750 TABLET, FILM COATED ORAL DAILY
Qty: 5 TABLET | Refills: 0 | Status: SHIPPED | OUTPATIENT
Start: 2024-10-20 | End: 2024-10-20

## 2024-10-20 RX ADMIN — BUPROPION HYDROCHLORIDE 150 MG: 150 TABLET, EXTENDED RELEASE ORAL at 09:47

## 2024-10-20 RX ADMIN — SODIUM CHLORIDE, PRESERVATIVE FREE 10 ML: 5 INJECTION INTRAVENOUS at 09:48

## 2024-10-20 ASSESSMENT — PAIN SCALES - GENERAL: PAINLEVEL_OUTOF10: 0

## 2024-10-20 NOTE — PROGRESS NOTES
Went through discharge instructions with pt. Pt verbalized understanding. Encouraged pt to follow up with a pcp within the note left by case management within discharge instructions. Scripts were escribed to his pharmacy. Iv and tele removed. Pt has all of his belongings. Walked pt down to his car in the ER lot.

## 2024-10-20 NOTE — DISCHARGE SUMMARY
V2.0  Discharge Summary    Name:  Jose Gomes /Age/Sex: 1980 (44 y.o. male)   Admit Date: 10/17/2024  Discharge Date: 10/20/24    MRN & CSN:  [id number] & [id number] Encounter Date and Time 10/20/24 10:10 AM EDT    Attending:  Hernan Dwyer MD Discharging Provider: Herann Dwyer MD       Hospital Course:     Brief HPI: As per admitting, \"Jose Gomes is a 44 y.o. male with CLL, chronic tobacco dependence presented to ED with complaints of shortness of breath and feeling sick.  Patient reported having the symptoms for last 3 days.Patient reports body aches, shortness of breath, cough with intermittent sputum production.  Patient reported that he checked his temperature at home with 2 thermometers and 1 read 200.7, the other rate 103.  Patient describes headache.  Denied any chest pain, admits to nausea, no vomiting, denied any abdominal pain, denying any urinary complaints, no constipation or diarrhea.  Patient reports having chronic back pain which got worse recently.  Admits to low appetite.  Vitals on arrival-/105, , RR 19, temp 100.1, saturating 96% on room air.  Labs significant for sodium 134, chloride 98, LFTs within normal range, troponin x 2<6, WBC 13.5.  UA with ketones, proteinuria.  Chest x-ray done in ED.\"    Pt admitted with pneumonia, pt improved sp IV antibiotics x 3 days, wcc normalized, no fevr or chills/ cough and clinical picture improved with meds. Sputum cultures w NGTD. Pt s/p 3 days ceftriaxone/ azithromycin- ill dc on levaqin x 7 days w close outpatient f/u w PCP / return if worsening.     Brief Problem Based Course:     #.  Sepsis  #.  CAP  -Tmax-102.4, tachycardia, WBC 13.5  -Lactic acid 0.9  -Check procalcitonin  -Chest x-ray-consolidation in the right upper lobe likely related to pneumonia.  -UA not suggestive of infection  -Blood cultures drawn in ED  -Patient received ceftriaxone, azithromycin-continue  -Received 1 L NS bolus in ER  -Check urine strep

## 2024-10-23 LAB
MICROORGANISM SPEC CULT: NORMAL
MICROORGANISM SPEC CULT: NORMAL
SERVICE CMNT-IMP: NORMAL
SERVICE CMNT-IMP: NORMAL
SPECIMEN DESCRIPTION: NORMAL
SPECIMEN DESCRIPTION: NORMAL

## 2024-12-12 ENCOUNTER — HOSPITAL ENCOUNTER (OUTPATIENT)
Age: 44
Discharge: HOME | End: 2024-12-12
Payer: COMMERCIAL

## 2024-12-12 ENCOUNTER — HOSPITAL ENCOUNTER (OUTPATIENT)
Dept: GENERAL RADIOLOGY | Age: 44
Discharge: HOME | End: 2024-12-12
Payer: COMMERCIAL

## 2024-12-12 DIAGNOSIS — J18.1 CONSOLIDATION OF RIGHT UPPER LOBE OF LUNG (HCC): ICD-10-CM

## 2024-12-12 PROCEDURE — 71046 X-RAY EXAM CHEST 2 VIEWS: CPT
